# Patient Record
Sex: MALE | Race: ASIAN | NOT HISPANIC OR LATINO | URBAN - METROPOLITAN AREA
[De-identification: names, ages, dates, MRNs, and addresses within clinical notes are randomized per-mention and may not be internally consistent; named-entity substitution may affect disease eponyms.]

---

## 2018-10-04 ENCOUNTER — COMMUNICATION - HEALTHEAST (OUTPATIENT)
Dept: SCHEDULING | Facility: CLINIC | Age: 48
End: 2018-10-04

## 2021-01-20 ENCOUNTER — OFFICE VISIT - HEALTHEAST (OUTPATIENT)
Dept: FAMILY MEDICINE | Facility: CLINIC | Age: 51
End: 2021-01-20

## 2021-01-20 DIAGNOSIS — L03.211 FACIAL CELLULITIS: ICD-10-CM

## 2021-01-20 LAB
ALBUMIN SERPL-MCNC: 4 G/DL (ref 3.5–5)
ALP SERPL-CCNC: 66 U/L (ref 45–120)
ALT SERPL W P-5'-P-CCNC: 16 U/L (ref 0–45)
ANION GAP SERPL CALCULATED.3IONS-SCNC: 9 MMOL/L (ref 5–18)
AST SERPL W P-5'-P-CCNC: 19 U/L (ref 0–40)
BILIRUB SERPL-MCNC: 1 MG/DL (ref 0–1)
BUN SERPL-MCNC: 14 MG/DL (ref 8–22)
C REACTIVE PROTEIN LHE: 0.8 MG/DL (ref 0–0.8)
CALCIUM SERPL-MCNC: 8.8 MG/DL (ref 8.5–10.5)
CHLORIDE BLD-SCNC: 104 MMOL/L (ref 98–107)
CO2 SERPL-SCNC: 27 MMOL/L (ref 22–31)
CREAT SERPL-MCNC: 0.83 MG/DL (ref 0.7–1.3)
ERYTHROCYTE [DISTWIDTH] IN BLOOD BY AUTOMATED COUNT: 11.7 % (ref 11–14.5)
GFR SERPL CREATININE-BSD FRML MDRD: >60 ML/MIN/1.73M2
GLUCOSE BLD-MCNC: 127 MG/DL (ref 70–125)
HCT VFR BLD AUTO: 41.1 % (ref 40–54)
HGB BLD-MCNC: 14.1 G/DL (ref 14–18)
MCH RBC QN AUTO: 32.5 PG (ref 27–34)
MCHC RBC AUTO-ENTMCNC: 34.3 G/DL (ref 32–36)
MCV RBC AUTO: 95 FL (ref 80–100)
PLATELET # BLD AUTO: 115 THOU/UL (ref 140–440)
PMV BLD AUTO: 6.9 FL (ref 7–10)
POTASSIUM BLD-SCNC: 3.7 MMOL/L (ref 3.5–5)
PROT SERPL-MCNC: 7.5 G/DL (ref 6–8)
RBC # BLD AUTO: 4.34 MILL/UL (ref 4.4–6.2)
SODIUM SERPL-SCNC: 140 MMOL/L (ref 136–145)
WBC: 6.6 THOU/UL (ref 4–11)

## 2021-01-22 ENCOUNTER — OFFICE VISIT - HEALTHEAST (OUTPATIENT)
Dept: FAMILY MEDICINE | Facility: CLINIC | Age: 51
End: 2021-01-22

## 2021-01-22 ENCOUNTER — COMMUNICATION - HEALTHEAST (OUTPATIENT)
Dept: FAMILY MEDICINE | Facility: CLINIC | Age: 51
End: 2021-01-22

## 2021-01-22 DIAGNOSIS — Z21 ASYMPTOMATIC HUMAN IMMUNODEFICIENCY VIRUS (HIV) INFECTION STATUS (H): ICD-10-CM

## 2021-01-22 DIAGNOSIS — R73.9 ELEVATED BLOOD SUGAR: ICD-10-CM

## 2021-01-22 DIAGNOSIS — R21 RASH AND NONSPECIFIC SKIN ERUPTION: ICD-10-CM

## 2021-01-22 DIAGNOSIS — I10 BENIGN ESSENTIAL HYPERTENSION: ICD-10-CM

## 2021-01-22 LAB
VARICELLA ZOSTER DNA COMMENT: ABNORMAL
VZV DNA SPEC QL NAA+PROBE: ABNORMAL
VZV PCR SPECIMEN: ABNORMAL

## 2021-01-25 ENCOUNTER — COMMUNICATION - HEALTHEAST (OUTPATIENT)
Dept: FAMILY MEDICINE | Facility: CLINIC | Age: 51
End: 2021-01-25

## 2021-01-25 ENCOUNTER — OFFICE VISIT - HEALTHEAST (OUTPATIENT)
Dept: FAMILY MEDICINE | Facility: CLINIC | Age: 51
End: 2021-01-25

## 2021-01-25 DIAGNOSIS — I10 UNCONTROLLED HYPERTENSION: ICD-10-CM

## 2021-01-25 DIAGNOSIS — B02.9 HERPES ZOSTER WITHOUT COMPLICATION: ICD-10-CM

## 2021-01-25 DIAGNOSIS — R73.9 HYPERGLYCEMIA: ICD-10-CM

## 2021-01-25 DIAGNOSIS — Z21 ASYMPTOMATIC HUMAN IMMUNODEFICIENCY VIRUS (HIV) INFECTION STATUS (H): ICD-10-CM

## 2021-01-25 LAB
ALBUMIN SERPL-MCNC: 4 G/DL (ref 3.5–5)
ALP SERPL-CCNC: 64 U/L (ref 45–120)
ALT SERPL W P-5'-P-CCNC: 22 U/L (ref 0–45)
ANION GAP SERPL CALCULATED.3IONS-SCNC: 9 MMOL/L (ref 5–18)
AST SERPL W P-5'-P-CCNC: 23 U/L (ref 0–40)
BASOPHILS # BLD AUTO: 0 THOU/UL (ref 0–0.2)
BASOPHILS NFR BLD AUTO: 0 % (ref 0–2)
BILIRUB SERPL-MCNC: 0.8 MG/DL (ref 0–1)
BUN SERPL-MCNC: 11 MG/DL (ref 8–22)
CALCIUM SERPL-MCNC: 8.9 MG/DL (ref 8.5–10.5)
CHLORIDE BLD-SCNC: 104 MMOL/L (ref 98–107)
CHOLEST SERPL-MCNC: 162 MG/DL
CO2 SERPL-SCNC: 26 MMOL/L (ref 22–31)
CREAT SERPL-MCNC: 0.76 MG/DL (ref 0.7–1.3)
EOSINOPHIL # BLD AUTO: 0.2 THOU/UL (ref 0–0.4)
EOSINOPHIL NFR BLD AUTO: 3 % (ref 0–6)
ERYTHROCYTE [DISTWIDTH] IN BLOOD BY AUTOMATED COUNT: 13.1 % (ref 11–14.5)
FASTING STATUS PATIENT QL REPORTED: NO
GFR SERPL CREATININE-BSD FRML MDRD: >60 ML/MIN/1.73M2
GLUCOSE BLD-MCNC: 78 MG/DL (ref 70–125)
HBA1C MFR BLD: 5.7 %
HCT VFR BLD AUTO: 43.8 % (ref 40–54)
HDLC SERPL-MCNC: 22 MG/DL
HGB BLD-MCNC: 15.1 G/DL (ref 14–18)
IMM GRANULOCYTES # BLD: 0 THOU/UL
IMM GRANULOCYTES NFR BLD: 0 %
LDLC SERPL CALC-MCNC: 100 MG/DL
LYMPHOCYTES # BLD AUTO: 3.3 THOU/UL (ref 0.8–4.4)
LYMPHOCYTES NFR BLD AUTO: 42 % (ref 20–40)
MCH RBC QN AUTO: 31.3 PG (ref 27–34)
MCHC RBC AUTO-ENTMCNC: 34.5 G/DL (ref 32–36)
MCV RBC AUTO: 91 FL (ref 80–100)
MONOCYTES # BLD AUTO: 0.6 THOU/UL (ref 0–0.9)
MONOCYTES NFR BLD AUTO: 7 % (ref 2–10)
NEUTROPHILS # BLD AUTO: 3.6 THOU/UL (ref 2–7.7)
NEUTROPHILS NFR BLD AUTO: 47 % (ref 50–70)
PLATELET # BLD AUTO: 194 THOU/UL (ref 140–440)
PMV BLD AUTO: 9.2 FL (ref 8.5–12.5)
POTASSIUM BLD-SCNC: 3.9 MMOL/L (ref 3.5–5)
PROT SERPL-MCNC: 7.8 G/DL (ref 6–8)
RBC # BLD AUTO: 4.83 MILL/UL (ref 4.4–6.2)
SODIUM SERPL-SCNC: 139 MMOL/L (ref 136–145)
TRIGL SERPL-MCNC: 202 MG/DL
WBC: 7.7 THOU/UL (ref 4–11)

## 2021-01-25 ASSESSMENT — MIFFLIN-ST. JEOR: SCORE: 1510.36

## 2021-01-29 LAB
HIV1 RNA # PLAS NAA DL=20: ABNORMAL {COPIES}/ML
HIV1 RNA SERPL NAA+PROBE-LOG#: 5.1 {LOG_COPIES}/ML

## 2021-02-01 ENCOUNTER — OFFICE VISIT - HEALTHEAST (OUTPATIENT)
Dept: FAMILY MEDICINE | Facility: CLINIC | Age: 51
End: 2021-02-01

## 2021-02-01 ENCOUNTER — COMMUNICATION - HEALTHEAST (OUTPATIENT)
Dept: INFECTIOUS DISEASES | Facility: CLINIC | Age: 51
End: 2021-02-01

## 2021-02-01 DIAGNOSIS — B02.9 HERPES ZOSTER WITHOUT COMPLICATION: ICD-10-CM

## 2021-02-01 DIAGNOSIS — I10 BENIGN ESSENTIAL HYPERTENSION: ICD-10-CM

## 2021-02-01 DIAGNOSIS — Z21 ASYMPTOMATIC HUMAN IMMUNODEFICIENCY VIRUS (HIV) INFECTION STATUS (H): ICD-10-CM

## 2021-02-10 ENCOUNTER — RECORDS - HEALTHEAST (OUTPATIENT)
Dept: ADMINISTRATIVE | Facility: OTHER | Age: 51
End: 2021-02-10

## 2021-02-16 ENCOUNTER — OFFICE VISIT - HEALTHEAST (OUTPATIENT)
Dept: INFECTIOUS DISEASES | Facility: CLINIC | Age: 51
End: 2021-02-16

## 2021-02-16 DIAGNOSIS — B20 AIDS (ACQUIRED IMMUNE DEFICIENCY SYNDROME) (H): ICD-10-CM

## 2021-02-16 DIAGNOSIS — D72.810 CD4 T LYMPHOCYTE DEFICIENCY: ICD-10-CM

## 2021-02-16 DIAGNOSIS — B20 AIDS (H): ICD-10-CM

## 2021-02-17 LAB
CD3 CELLS # BLD: 2032 /UL
CD3 CELLS NFR BLD: 82 % (ref 53–86)
CD3+CD4+ CELLS # BLD: 381 /UL
CD3+CD4+ CELLS NFR BLD: 16 % (ref 31–67)
CD3+CD4+ CELLS/CD3+CD8+ CLL BLD: 0.24 % (ref 0.8–4.5)
CD3+CD8+ CELLS # BLD: 1569 /UL (ref 117–923)
CD3+CD8+ CELLS NFR BLD: 64 % (ref 13–44)
LYMPHOCYTES # BLD AUTO: 2.5 THOU/UL (ref 0.8–4.4)

## 2021-02-18 LAB
HIV1 RNA # PLAS NAA DL=20: ABNORMAL {COPIES}/ML
HIV1 RNA SERPL NAA+PROBE-LOG#: 4.8 {LOG_COPIES}/ML
T GONDII IGG SER-ACNC: >400 IU/ML
T GONDII IGM SER-ACNC: <3 AU/ML

## 2021-02-22 ENCOUNTER — COMMUNICATION - HEALTHEAST (OUTPATIENT)
Dept: FAMILY MEDICINE | Facility: CLINIC | Age: 51
End: 2021-02-22

## 2021-02-22 ENCOUNTER — AMBULATORY - HEALTHEAST (OUTPATIENT)
Dept: NURSING | Facility: CLINIC | Age: 51
End: 2021-02-22

## 2021-02-22 DIAGNOSIS — I10 BENIGN ESSENTIAL HYPERTENSION: ICD-10-CM

## 2021-02-24 ENCOUNTER — OFFICE VISIT - HEALTHEAST (OUTPATIENT)
Dept: FAMILY MEDICINE | Facility: CLINIC | Age: 51
End: 2021-02-24

## 2021-02-24 DIAGNOSIS — Z21 ASYMPTOMATIC HUMAN IMMUNODEFICIENCY VIRUS (HIV) INFECTION STATUS (H): ICD-10-CM

## 2021-02-24 DIAGNOSIS — I10 BENIGN ESSENTIAL HYPERTENSION: ICD-10-CM

## 2021-02-24 DIAGNOSIS — B02.29 POSTHERPETIC NEURALGIA: ICD-10-CM

## 2021-02-24 RX ORDER — METOPROLOL SUCCINATE 100 MG/1
100 TABLET, EXTENDED RELEASE ORAL DAILY
Qty: 90 TABLET | Refills: 1 | Status: SHIPPED | OUTPATIENT
Start: 2021-02-24 | End: 2021-09-23

## 2021-02-24 ASSESSMENT — MIFFLIN-ST. JEOR: SCORE: 1521.75

## 2021-02-25 LAB — ARUP MISCELLANEOUS TEST: NORMAL

## 2021-02-26 LAB
MISCELLANEOUS TEST DEPT. - HE HISTORICAL: NORMAL
PERFORMING LAB: NORMAL
SPECIMEN STATUS: NORMAL
TEST NAME: NORMAL

## 2021-03-01 ENCOUNTER — COMMUNICATION - HEALTHEAST (OUTPATIENT)
Dept: INFECTIOUS DISEASES | Facility: CLINIC | Age: 51
End: 2021-03-01

## 2021-03-09 ENCOUNTER — OFFICE VISIT - HEALTHEAST (OUTPATIENT)
Dept: INFECTIOUS DISEASES | Facility: CLINIC | Age: 51
End: 2021-03-09

## 2021-03-09 DIAGNOSIS — B20 AIDS (ACQUIRED IMMUNE DEFICIENCY SYNDROME) (H): ICD-10-CM

## 2021-04-06 ENCOUNTER — AMBULATORY - HEALTHEAST (OUTPATIENT)
Dept: LAB | Facility: CLINIC | Age: 51
End: 2021-04-06

## 2021-04-06 ENCOUNTER — OFFICE VISIT - HEALTHEAST (OUTPATIENT)
Dept: INFECTIOUS DISEASES | Facility: CLINIC | Age: 51
End: 2021-04-06

## 2021-04-06 DIAGNOSIS — Z21 ASYMPTOMATIC HUMAN IMMUNODEFICIENCY VIRUS (HIV) INFECTION STATUS (H): ICD-10-CM

## 2021-04-08 LAB
HIV1 RNA # PLAS NAA DL=20: 77 {COPIES}/ML
HIV1 RNA SERPL NAA+PROBE-LOG#: 1.9 {LOG_COPIES}/ML

## 2021-05-27 VITALS — DIASTOLIC BLOOD PRESSURE: 81 MMHG | HEART RATE: 85 BPM | SYSTOLIC BLOOD PRESSURE: 128 MMHG

## 2021-06-05 VITALS
HEART RATE: 84 BPM | WEIGHT: 151 LBS | SYSTOLIC BLOOD PRESSURE: 138 MMHG | BODY MASS INDEX: 22.96 KG/M2 | TEMPERATURE: 98.7 F | DIASTOLIC BLOOD PRESSURE: 92 MMHG

## 2021-06-05 VITALS
SYSTOLIC BLOOD PRESSURE: 172 MMHG | RESPIRATION RATE: 16 BRPM | HEART RATE: 134 BPM | WEIGHT: 153 LBS | OXYGEN SATURATION: 99 % | DIASTOLIC BLOOD PRESSURE: 99 MMHG

## 2021-06-05 VITALS
BODY MASS INDEX: 24.07 KG/M2 | TEMPERATURE: 98 F | SYSTOLIC BLOOD PRESSURE: 120 MMHG | DIASTOLIC BLOOD PRESSURE: 70 MMHG | HEART RATE: 68 BPM | WEIGHT: 157 LBS

## 2021-06-05 VITALS
TEMPERATURE: 98.5 F | WEIGHT: 152.5 LBS | DIASTOLIC BLOOD PRESSURE: 82 MMHG | HEIGHT: 68 IN | SYSTOLIC BLOOD PRESSURE: 123 MMHG | HEART RATE: 80 BPM | BODY MASS INDEX: 23.11 KG/M2 | RESPIRATION RATE: 18 BRPM

## 2021-06-05 VITALS
HEIGHT: 68 IN | HEART RATE: 106 BPM | SYSTOLIC BLOOD PRESSURE: 148 MMHG | BODY MASS INDEX: 22.58 KG/M2 | RESPIRATION RATE: 20 BRPM | DIASTOLIC BLOOD PRESSURE: 108 MMHG | WEIGHT: 149 LBS

## 2021-06-05 VITALS
HEART RATE: 103 BPM | SYSTOLIC BLOOD PRESSURE: 154 MMHG | TEMPERATURE: 98.3 F | WEIGHT: 148 LBS | DIASTOLIC BLOOD PRESSURE: 99 MMHG

## 2021-06-05 VITALS
DIASTOLIC BLOOD PRESSURE: 93 MMHG | BODY MASS INDEX: 22.81 KG/M2 | SYSTOLIC BLOOD PRESSURE: 135 MMHG | HEART RATE: 91 BPM | WEIGHT: 150 LBS | RESPIRATION RATE: 24 BRPM

## 2021-06-05 VITALS
TEMPERATURE: 98.5 F | DIASTOLIC BLOOD PRESSURE: 88 MMHG | BODY MASS INDEX: 23.31 KG/M2 | SYSTOLIC BLOOD PRESSURE: 122 MMHG | WEIGHT: 152 LBS | HEART RATE: 80 BPM

## 2021-06-14 ENCOUNTER — RECORDS - HEALTHEAST (OUTPATIENT)
Dept: FAMILY MEDICINE | Facility: CLINIC | Age: 51
End: 2021-06-14

## 2021-06-14 NOTE — PROGRESS NOTES
ASSESSMENT/PLAN:  1. Herpes zoster without complication     2. Asymptomatic human immunodeficiency virus (HIV) infection status (H)  HM1(CBC and Differential)    HIV-1 RNA Quantification   3. Uncontrolled hypertension  metoprolol succinate (TOPROL-XL) 50 MG 24 hr tablet    Comprehensive Metabolic Panel    Lipid Cascade FASTING   4. Hyperglycemia  Glycosylated Hemoglobin A1c       This is a 51 yo male with:  1.  Herpes zoster - reviewed recent lab results - positive for herpes zoster.  Rash is improving.  Pain is not significant.   2.  HIV infection - asymptomatic - patient has h/o this asymptomatic HIV status - not taking any medications; will check quant HIV to check on status  3.  Uncontrolled hypertension - this needs to be controlled more quickly and better - increase Metoprolol and recheck 7-10 days  4.  Hyperglycemia - check A1c to determine if patient is diabetic.     Return in about 10 days (around 2/4/2021) for Recheck, BP Check.      There are no discontinued medications.  There are no Patient Instructions on file for this visit.    Chief Complaint:  Chief Complaint   Patient presents with     Follow-up     skin       HPI:   Cameron Jensen is a 50 y.o. male c/o  1.  Rash is getting better - pain isn't bad -   No further spreading  Scabbed over now  2.  HIV infection - patient has h/o HIV  Doesn't recall taking medications in past (review of chart suggests at least at one time he was taking Truvada)  3.  Hypertension - no chest pain, no short of breath  4.  Denies h/o diabetes    PMH:   Patient Active Problem List    Diagnosis Date Noted     Herpes zoster without complication 01/31/2021     Nonspecific reaction to tuberculin skin test without active tuberculosis 12/17/2004     Asymptomatic human immunodeficiency virus (hiv) infection status (H) 10/20/2003     No past medical history on file.  No past surgical history on file.  Social History     Socioeconomic History     Marital status: Single     Spouse name:  "Not on file     Number of children: Not on file     Years of education: Not on file     Highest education level: Not on file   Occupational History     Not on file   Social Needs     Financial resource strain: Not on file     Food insecurity     Worry: Not on file     Inability: Not on file     Transportation needs     Medical: Not on file     Non-medical: Not on file   Tobacco Use     Smoking status: Current Every Day Smoker     Smokeless tobacco: Current User   Substance and Sexual Activity     Alcohol use: Not Currently     Drug use: Never     Sexual activity: Not on file   Lifestyle     Physical activity     Days per week: Not on file     Minutes per session: Not on file     Stress: Not on file   Relationships     Social connections     Talks on phone: Not on file     Gets together: Not on file     Attends Rastafari service: Not on file     Active member of club or organization: Not on file     Attends meetings of clubs or organizations: Not on file     Relationship status: Not on file     Intimate partner violence     Fear of current or ex partner: Not on file     Emotionally abused: Not on file     Physically abused: Not on file     Forced sexual activity: Not on file   Other Topics Concern     Not on file   Social History Narrative     Not on file     No family history on file.    Meds:    Current Outpatient Medications:      valACYclovir (VALTREX) 1000 MG tablet, Take 1 tablet (1,000 mg total) by mouth 3 (three) times a day for 10 days., Disp: 30 tablet, Rfl: 0     metoprolol succinate (TOPROL-XL) 50 MG 24 hr tablet, Take 1 tablet (50 mg total) by mouth daily., Disp: 30 tablet, Rfl: 1    Allergies:  No Known Allergies    ROS:  Pertinent positives as noted in HPI; otherwise 12 point ROS negative.      Physical Exam:  EXAM:  BP (!) 148/108 (Patient Site: Right Arm, Patient Position: Sitting, Cuff Size: Adult Regular)   Pulse (!) 106   Resp 20   Ht 5' 8\" (1.727 m)   Wt 149 lb (67.6 kg)   BMI 22.66 kg/m   "   Gen:  NAD, appears well, well-hydrated  HEENT:  TMs nl, oropharynx benign, nasal mucosa nl, conjunctiva clear  Neck:  Supple, no adenopathy, no thyromegaly, no carotid bruits, no JVD  Lungs:  Clear to auscultation bilaterally  Cor:  RRR no murmur  Abd:  Soft, nontender, BS+, no masses, no guarding or rebound, no HSM  Extr:  Neg.  Neuro:  No asymmetry  Skin:  Warm/dry, scabbed lesions - erythema improved - on lower right mandible/neck - does not cross midline to left side        Results:  Results for orders placed or performed in visit on 01/25/21   Glycosylated Hemoglobin A1c   Result Value Ref Range    Hemoglobin A1c 5.7 (H) <=5.6 %   Comprehensive Metabolic Panel   Result Value Ref Range    Sodium 139 136 - 145 mmol/L    Potassium 3.9 3.5 - 5.0 mmol/L    Chloride 104 98 - 107 mmol/L    CO2 26 22 - 31 mmol/L    Anion Gap, Calculation 9 5 - 18 mmol/L    Glucose 78 70 - 125 mg/dL    BUN 11 8 - 22 mg/dL    Creatinine 0.76 0.70 - 1.30 mg/dL    GFR MDRD Af Amer >60 >60 mL/min/1.73m2    GFR MDRD Non Af Amer >60 >60 mL/min/1.73m2    Bilirubin, Total 0.8 0.0 - 1.0 mg/dL    Calcium 8.9 8.5 - 10.5 mg/dL    Protein, Total 7.8 6.0 - 8.0 g/dL    Albumin 4.0 3.5 - 5.0 g/dL    Alkaline Phosphatase 64 45 - 120 U/L    AST 23 0 - 40 U/L    ALT 22 0 - 45 U/L   Lipid Cascade FASTING   Result Value Ref Range    Cholesterol 162 <=199 mg/dL    Triglycerides 202 (H) <=149 mg/dL    HDL Cholesterol 22 (L) >=40 mg/dL    LDL Calculated 100 <=129 mg/dL    Patient Fasting > 8hrs? No    HIV-1 RNA Quantification   Result Value Ref Range    HIV-1 RNA Qt 355157 (!) HIVND [Copies]/mL    Log of HIV RNA Qt 5.1 (H) <1.3 [Log_copies]/mL   HM1 (CBC with Diff)   Result Value Ref Range    WBC 7.7 4.0 - 11.0 thou/uL    RBC 4.83 4.40 - 6.20 mill/uL    Hemoglobin 15.1 14.0 - 18.0 g/dL    Hematocrit 43.8 40.0 - 54.0 %    MCV 91 80 - 100 fL    MCH 31.3 27.0 - 34.0 pg    MCHC 34.5 32.0 - 36.0 g/dL    RDW 13.1 11.0 - 14.5 %    Platelets 194 140 - 440 thou/uL     MPV 9.2 8.5 - 12.5 fL    Neutrophils % 47 (L) 50 - 70 %    Lymphocytes % 42 (H) 20 - 40 %    Monocytes % 7 2 - 10 %    Eosinophils % 3 0 - 6 %    Basophils % 0 0 - 2 %    Immature Granulocyte % 0 <=0 %    Neutrophils Absolute 3.6 2.0 - 7.7 thou/uL    Lymphocytes Absolute 3.3 0.8 - 4.4 thou/uL    Monocytes Absolute 0.6 0.0 - 0.9 thou/uL    Eosinophils Absolute 0.2 0.0 - 0.4 thou/uL    Basophils Absolute 0.0 0.0 - 0.2 thou/uL    Immature Granulocyte Absolute 0.0 <=0.0 thou/uL

## 2021-06-14 NOTE — TELEPHONE ENCOUNTER
ID Team      Please review and advise on scheduling.     Authorizing: Yun Vyas MD in [facility] FAM[facility]IC[facility]/OB    Referral: [id number] (Pending Review)           Priority: Routine   Diagnosis: Asymptomatic human immunodeficiency virus (hiv) infection status

## 2021-06-14 NOTE — PATIENT INSTRUCTIONS - HE
Stop the Metoprolol 50 mg daily.   new prescription at pharmacy and take Metoprolol 100 mg daily (one tablet).    Recheck blood pressure in 2-3 weeks.

## 2021-06-14 NOTE — PROGRESS NOTES
ASSESSMENT/PLAN:  1. Rash and nonspecific skin eruption  valACYclovir (VALTREX) 1000 MG tablet    Varicella-Zoster Virus DNA by PCR, CSF or Skin Swab(VZDNA)   2. Asymptomatic human immunodeficiency virus (HIV) infection status (H)  HIV-1 RNA Quantification    HM1(CBC and Differential)   3. Elevated blood sugar  Glycosylated Hemoglobin A1c    Comprehensive Metabolic Panel   4. Benign essential hypertension         This is a 49 yo male with h/o previously asymptomatic HIV infection status as well as h/o TB.  He is unable to provide any details of these diagnoses; he indicates he never took any medications for this, but outside records would indicate that he took Truvada and perhaps other medications in past. Here with:  1.  Rash/cellulitis - right face/ neck.  He presented 2 days ago with what was felt to be a cellulitis/rash on face.  Today, he feels as if it is a bit worse - taking cephalexin three times a day.  The rash is present on lateral mandibular region extending to the right ear - with inferior pinna very swollen and red; this extends to the neck and sl superior chest - all of this extended midline to midline - does not cross the midline; there are small vescular./pustular lesions on erythematous patches.  It is all quite inflamed.  I am fairly certain this is a herpes zoster rash.  It does seem to cross at least a couple dermatomes :  Right C3 and C4.  Will add Valacyclovir; continue his Cephalexin. Culture sent to verify diagnosis.   Would like to see him back in 2-3 days for re-evaluation. Discussed (especially in light of his underlying presumed HIV status) that he should seek treatment emergently if he is getting worse.    2.  Asymptomatic HIV status:  Patient has no idea what the status of this infection is.  Will check quant RNA.  3.  Elevated blood sugar - will add a1c to rule out diabetes  4.  Benign HTN - blood pressure is too high - will review at next visit.    Return in about 2 days (around  1/24/2021) for Recheck.      There are no discontinued medications.  There are no Patient Instructions on file for this visit.    Chief Complaint:  Chief Complaint   Patient presents with     Follow-up     rash and neck        HPI:   Cameron Jensen is a 50 y.o. male c/o  Seen for face infection/swelling, right ear pain a couple days ago  Taking Cephalexin 500 mg three times a day x 10 days - has 8 days to go     Doesn't know status of his HIV; doesn't know if he ever had TB  Thinks he had something like this in past       PMH:   There are no active problems to display for this patient.    No past medical history on file.  No past surgical history on file.  Social History     Socioeconomic History     Marital status: Single     Spouse name: Not on file     Number of children: Not on file     Years of education: Not on file     Highest education level: Not on file   Occupational History     Not on file   Social Needs     Financial resource strain: Not on file     Food insecurity     Worry: Not on file     Inability: Not on file     Transportation needs     Medical: Not on file     Non-medical: Not on file   Tobacco Use     Smoking status: Current Every Day Smoker     Smokeless tobacco: Current User   Substance and Sexual Activity     Alcohol use: Not Currently     Drug use: Never     Sexual activity: Not on file   Lifestyle     Physical activity     Days per week: Not on file     Minutes per session: Not on file     Stress: Not on file   Relationships     Social connections     Talks on phone: Not on file     Gets together: Not on file     Attends Congregation service: Not on file     Active member of club or organization: Not on file     Attends meetings of clubs or organizations: Not on file     Relationship status: Not on file     Intimate partner violence     Fear of current or ex partner: Not on file     Emotionally abused: Not on file     Physically abused: Not on file     Forced sexual activity: Not on file   Other  Topics Concern     Not on file   Social History Narrative     Not on file     No family history on file.    Meds:    Current Outpatient Medications:      cephalexin (KEFLEX) 500 MG capsule, Take 1 capsule (500 mg total) by mouth 3 (three) times a day for 10 days., Disp: 30 capsule, Rfl: 0     mupirocin (BACTROBAN) 2 % ointment, Apply to affected area 3 times daily 7 days, Disp: 22 g, Rfl: 0     valACYclovir (VALTREX) 1000 MG tablet, Take 1 tablet (1,000 mg total) by mouth 3 (three) times a day for 10 days., Disp: 30 tablet, Rfl: 0    Allergies:  No Known Allergies    ROS:  Pertinent positives as noted in HPI; otherwise 12 point ROS negative.      Physical Exam:  EXAM:  BP (!) 154/99 (Patient Site: Right Arm, Patient Position: Sitting, Cuff Size: Adult Regular)   Pulse (!) 103   Temp 98.3  F (36.8  C) (Temporal)   Wt 148 lb (67.1 kg)    Gen:  NAD, appears well, well-hydrated  HEENT:  TMs nl, oropharynx benign, nasal mucosa nl, conjunctiva clear  Neck:  Supple, no adenopathy, no thyromegaly, no carotid bruits, no JVD  Lungs:  Clear to auscultation bilaterally  Cor:  RRR no murmur  Abd:  Soft, nontender, BS+, no masses, no guarding or rebound, no HSM  Extr:  Neg.  Neuro:  No asymmetry  Skin:  Warm/dry; impressive rash on right lower face, ear (pinna is swollen ) and distal to upper chest wall - there are discrete red patches with pustular lesions as well as crusted lesions - all of this extends from midline to midline        Results:  Results for orders placed or performed in visit on 01/20/21   2(CBC w/o Differential)   Result Value Ref Range    WBC 6.6 4.0 - 11.0 thou/uL    RBC 4.34 (L) 4.40 - 6.20 mill/uL    Hemoglobin 14.1 14.0 - 18.0 g/dL    Hematocrit 41.1 40.0 - 54.0 %    MCV 95 80 - 100 fL    MCH 32.5 27.0 - 34.0 pg    MCHC 34.3 32.0 - 36.0 g/dL    RDW 11.7 11.0 - 14.5 %    Platelets 115 (L) 140 - 440 thou/uL    MPV 6.9 (L) 7.0 - 10.0 fL   Comprehensive Metabolic Panel   Result Value Ref Range    Sodium  140 136 - 145 mmol/L    Potassium 3.7 3.5 - 5.0 mmol/L    Chloride 104 98 - 107 mmol/L    CO2 27 22 - 31 mmol/L    Anion Gap, Calculation 9 5 - 18 mmol/L    Glucose 127 (H) 70 - 125 mg/dL    BUN 14 8 - 22 mg/dL    Creatinine 0.83 0.70 - 1.30 mg/dL    GFR MDRD Af Amer >60 >60 mL/min/1.73m2    GFR MDRD Non Af Amer >60 >60 mL/min/1.73m2    Bilirubin, Total 1.0 0.0 - 1.0 mg/dL    Calcium 8.8 8.5 - 10.5 mg/dL    Protein, Total 7.5 6.0 - 8.0 g/dL    Albumin 4.0 3.5 - 5.0 g/dL    Alkaline Phosphatase 66 45 - 120 U/L    AST 19 0 - 40 U/L    ALT 16 0 - 45 U/L   C-Reactive Protein   Result Value Ref Range    CRP 0.8 0.0 - 0.8 mg/dL

## 2021-06-15 NOTE — PROGRESS NOTES
ASSESSMENT/PLAN:  1. Postherpetic neuralgia     2. Benign essential hypertension  metoprolol succinate (TOPROL-XL) 100 MG 24 hr tablet   3. Asymptomatic human immunodeficiency virus (hiv) infection status (H)         This is a 51 yo male with underlying HIV (now following with ID again).  Had recent severe herpes zoster right ear/neck//chest.  Now complains of pain, but mostly tingling of the right ear/pinna.  Discussed with patient - much of these symptoms have improved - and I suspect the tingling of the ear will continue to improve as well.  It is not particularly painful enough and patient does not want to consider pain meds (such as gabapentin).  Encourage patient to follow up with ID for the HIV.    Also - patient has underlying hypertension - now controlled.  Continue Metoprolol - refill written.   Return in about 6 months (around 8/24/2021) for BP Check.      Medications Discontinued During This Encounter   Medication Reason     metoprolol succinate (TOPROL-XL) 100 MG 24 hr tablet Reorder     There are no Patient Instructions on file for this visit.    Chief Complaint:  Chief Complaint   Patient presents with     Follow-up       HPI:   Brenda THORNTON Camila is a 50 y.o. male c/o  Complains of numbness/ tingling in right ear/neck  Ear is chilling  When scratches the ear, doesn't feel any sensation    Didn't use cream because it was $300 -  Apparently paid $10 for ointment, but the cream (Mupirocin) was $293!  Wants to know what to do about the cream  Worries about the color of the rash - (scars from zoster - healing) - should avoid sun exposure; should use sun screen     Blood pressure medication -   BP is stable  -     PMH:   Patient Active Problem List    Diagnosis Date Noted     Benign essential hypertension 02/01/2021     Herpes zoster without complication 01/31/2021     Nonspecific reaction to tuberculin skin test without active tuberculosis 12/17/2004     Asymptomatic human immunodeficiency virus (hiv)  infection status (H) 10/20/2003     No past medical history on file.  No past surgical history on file.  Social History     Socioeconomic History     Marital status: Single     Spouse name: Not on file     Number of children: Not on file     Years of education: Not on file     Highest education level: Not on file   Occupational History     Not on file   Social Needs     Financial resource strain: Not on file     Food insecurity     Worry: Not on file     Inability: Not on file     Transportation needs     Medical: Not on file     Non-medical: Not on file   Tobacco Use     Smoking status: Current Every Day Smoker     Smokeless tobacco: Current User   Substance and Sexual Activity     Alcohol use: Not Currently     Drug use: Never     Sexual activity: Not on file   Lifestyle     Physical activity     Days per week: Not on file     Minutes per session: Not on file     Stress: Not on file   Relationships     Social connections     Talks on phone: Not on file     Gets together: Not on file     Attends Islam service: Not on file     Active member of club or organization: Not on file     Attends meetings of clubs or organizations: Not on file     Relationship status: Not on file     Intimate partner violence     Fear of current or ex partner: Not on file     Emotionally abused: Not on file     Physically abused: Not on file     Forced sexual activity: Not on file   Other Topics Concern     Not on file   Social History Narrative     Not on file     No family history on file.    Meds:    Current Outpatient Medications:      metoprolol succinate (TOPROL-XL) 100 MG 24 hr tablet, Take 1 tablet (100 mg total) by mouth daily., Disp: 90 tablet, Rfl: 1    Allergies:  No Known Allergies    ROS:  Pertinent positives as noted in HPI; otherwise 12 point ROS negative.      Physical Exam:  EXAM:  /82 (Patient Site: Right Arm, Patient Position: Sitting, Cuff Size: Adult Regular)   Pulse 80   Temp 98.5  F (36.9  C) (Temporal)   " Resp 18   Ht 5' 7.72\" (1.72 m)   Wt 152 lb 8 oz (69.2 kg)   BMI 23.38 kg/m     Gen:  NAD, appears well, well-hydrated  HEENT:  TMs nl, oropharynx benign, nasal mucosa nl, conjunctiva clear  Neck:  Supple, no adenopathy, no thyromegaly, no carotid bruits, no JVD  Lungs:  Clear to auscultation bilaterally  Cor:  RRR no murmur  Abd:  Soft, nontender, BS+, no masses, no guarding or rebound, no HSM  Extr:  Neg.  Neuro:  No asymmetry  Skin:  Warm/dry, healing lesions on right ear/chin/neck onto chest - some discoloration - mild dysesthesias of right ea      Results:  Results for orders placed or performed in visit on 02/16/21   HIV-1 RNA Quantification   Result Value Ref Range    HIV-1 RNA Qt 74066 (!) HIVND [Copies]/mL    Log of HIV RNA Qt 4.8 (H) <1.3 [Log_copies]/mL   hiv resistance genotype testing.  usually done a [facility]. Lab Test   Result Value Ref Range    Miscellanous Test Dept. Chemistry Reference Test     Test Name        HIV1 Genotype and Integrase Inhibitor Resistance by Sequencing    Performing Lab       [facility]  [address] [place] [id number]    Scan Result See [facility] Miscellaneous Test for results    Toxoplasma gondii Antibody, IgG and IgM   Result Value Ref Range    Toxoplasma gondii Ab, IgG >400.0 IU/mL    Toxoplasma gondii Ab, IgM <3.0 <=7.9 AU/mL   HSP   Result Value Ref Range    Lymphocytes Absolute 2.5 0.8 - 4.4 thou/uL    CD3 % Total T Cell 82 53 - 86 %    CD3 Abs 2,032 471-2,787 /uL    CD4 % Roxana T Cell 16 (L) 31 - 67 %    CD4 T Cell Abs 381 269-1,625 /uL    CD8 % Suppressor T Cell 64 (H) 13 - 44 %    CD8 T Cell Abs 1,569 (H) 117 - 923 /uL    CD4/CD8 Ratio 0.24 (L) 0.80 - 4.50   [facility]   Result Value Ref Range    [facility] Miscellaneous Test SEE NOTE              "

## 2021-06-15 NOTE — PATIENT INSTRUCTIONS - HE
Start on Biktarvy and stay on it long-term.    We will call to [facility].    Please return in 4 weeks.    Robel Muñiz

## 2021-06-15 NOTE — TELEPHONE ENCOUNTER
LVMTCB- has appt 3/9/21 will discuss then if pt does not call back    ----- Message from Robel Muñiz MD sent at 2/18/2021  1:00 PM CST -----  Good news! His CD4 count remains 381 so he is in no immanent risk for HIV-related infectoin besides the zoster.  We will start on meds as soon as we document to viral resistance is present.  Robel Muñiz

## 2021-06-15 NOTE — PROGRESS NOTES
Infectious Disease Progress Note  Assessment:  1. HIV pos since 2002.  Was followed at [facility]. Was transiently on Truvada and kaletra [id number]. None since.  2. Has since has stable CD4 counts around 400 and viral load <10,000 x 19 years.      CD4 last month still 380 and VL 67,000 and HIV genotype wo resistance.  3. Tested pos on IGRA test x 1 with three subsequent neg tests. Neg for HCV and Pos for HBsAB. Pos for HAV antibody. Toxo antibody pos.  Past tests for syphilis, GC, chlamydia all negative  4.  Has sex once a week with +- condom use.  5. 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  6. Now with recent VZV L V1 near area and recent viral load of 110,000. Thus now progressed to AIDS.      No fever, wt loss, shortness of breath and cbc and cmc all normal.     Recommendations:  1. Start Biktarvy now.  2. Use condoms until further notice.  3. RTC one month.  4. F/U HIV labs then.    30 min spent. Had to use Urdu  to communicate.     Thanks for asking me to participate in this patients care.  Robel Muñiz      ______________________________________________________________________    Subjective: doing ok, no new sx.  Wt stable.    Review of systems:  Patient denies fever, chills, cough, sputum, vomiting, diarrhea, dysuria, urgency, flank pain, headache, stiff neck, rash, swollen glands or pain at IV sites.  SH/FH/ Habits/ PMH reviewed and unchanged.  Objective:   /88   Pulse 80   Temp 98.5  F (36.9  C)   Wt 152 lb (68.9 kg)   BMI 23.31 kg/m          Exam:skin: some redness R neck s/p VZV here.  Nodes: no cervical, axillary or inguinal adenopathy  ENT: TMs clear, oropharynx without exudate or thrush  Respiratory: normal respiratory patter, no rales or rubs.  CV: normal heart tones. No rub, murmur or S3. No JVD.  Abdmomen: soft, normal bowel sounds, non-tender, no masses or   organomegaly    Back: No spine or CVA tenderness  Neurologic: oriented x 3. Cranial nerves 2-12 intact. No focal  weakness or sensory loss.   Neck: supple, no masses, no thyromegaly.                Lab Results   Component Value Date    ALT 22 01/25/2021    AST 23 01/25/2021    ALKPHOS 64 01/25/2021    BILITOT 0.8 01/25/2021       Imaging:none new  Microbiology:HIV GENOTYPE neg for resistance.    Patient Active Problem List   Diagnosis     Nonspecific reaction to tuberculin skin test without active tuberculosis     Asymptomatic human immunodeficiency virus (hiv) infection status (H)     Herpes zoster without complication     Benign essential hypertension

## 2021-06-15 NOTE — PROGRESS NOTES
Consultation - Infectious Disease  [facility] Cameron Jensen,  1970, MRN [id number]    [facility] ID CL[facility].  Asymptomatic human immunodeficiency virus (hiv) infection status (H) [Z21]    PCP: Yun Vyas MD, [phone]   Code status:  full       Extended Emergency Contact Information  Primary Emergency Contact: Magdalena Prieto  Mobile Phone: [phone]  Relation: Niece       Assessment and Plan     Assessment:  1. HIV pos since .  Was followed at [facility]. Was transiently on Truvada and kaletra [phone]. None since.  2. Has since has stable CD4 counts around 400 and viral load <10,000 x 19 years.  3. Tested pos on IGRA test x 1 with three subsequent neg tests. Neg for HCV and Pos for HBsAB. Past tests for syphilis, GC, chlamydia all negative  4.  Has sex once a week with +- condom use.  5. Works in a [employer] company and lives alone.  6. Now with recent VZV L V1 near area and recent viral load of 110,000. Thus now progressed to AIDS.      No fever, wt loss, shortness of breath and cbc and cmc all normal.    Recommendations:  1. Check CD4, viral resistance genotype, toxo serologies.  2. RTC 3 weeks when results should be back and  Likely start Biktarvy or Delstrigo then.  Start prophylaxis for PJP and MAC as needed based on CD4 count.  I spent a lot of time via  explaining the necessity of medication at this point and he seems to understand.  3. Use condoms until further notice.  4. Check toxo serology     Thanks for asking me to participate in this patients care.  Robel Muñiz       Chief Complaint Recent shinles.       HPI   We have been requested by Dr Vyas to evaluate [facility] Cameron Jensen for AIDS  Hx is as above. Other than recent H zoster he has no active symptoms. No fever, wt stable, no sx STD, no diarrea, no shortness of breath.  Has purple rash R neck and ear in area of healing Zoster .     Medical History  Active Ambulatory (Non-Hospital) Problems    Diagnosis     Benign  essential hypertension     Herpes zoster without complication     Nonspecific reaction to tuberculin skin test without active tuberculosis     Asymptomatic human immunodeficiency virus (hiv) infection status (H)     No past medical history on file.  PMH reviewed, updated, w/o changes. Surgical History  He  has no past surgical history on file.   Social History  Reviewed, and he  reports that he has been smoking. He uses smokeless tobacco. He reports previous alcohol use. He reports that he does not use drugs.   Allergies  No Known Allergies Family History  Reviewed, and family history is not on file.        Prior to Admission Medications   Current Outpatient Medications on File Prior to Visit   Medication Sig Dispense Refill     metoprolol succinate (TOPROL-XL) 100 MG 24 hr tablet Take 1 tablet (100 mg total) by mouth daily. 30 tablet 1     No current facility-administered medications on file prior to visit.             Review of Systems:  Review of systems - negative as above. Physical Exam:  Looks fine.  Superficial patcy purple rash R nkechi and adjacent neg  Eyes: anicteric. EOMs full. GERSON.  Neck: supple, no masses, no thyromegaly.  Nodes: no cervical, axillary or inguinal adenopathy  Respiratory: normal respiratory patter, no rales or rubs.  CV: normal heart tones. No rub, murmur or S3. No JVD.  Abdmomen: soft, normal bowel sounds, non-tender, no masses or   organomegaly  .  Back: No spine or CVA tenderness  Extremities: normal venous pattern. Good pulses. No edema. No joint effusions..  Neurologic: oriented x 3. Cranial nerves 2-12 intact. No focal weakness or sensory loss.            Pertinent Labs  Lab Results:personally reviewed.   Lab Results   Component Value Date     01/25/2021     01/20/2021    K 3.9 01/25/2021    K 3.7 01/20/2021    CO2 26 01/25/2021    CO2 27 01/20/2021    BUN 11 01/25/2021    BUN 14 01/20/2021    CREATININE 0.76 01/25/2021    CREATININE 0.83 01/20/2021    CALCIUM 8.9  01/25/2021    CALCIUM 8.8 01/20/2021     Lab Results   Component Value Date    WBC 7.7 01/25/2021    WBC 6.6 01/20/2021    HGB 15.1 01/25/2021    HGB 14.1 01/20/2021    HCT 43.8 01/25/2021    HCT 41.1 01/20/2021    MCV 91 01/25/2021    MCV 95 01/20/2021     01/25/2021     (L) 01/20/2021     Lab Results   Component Value Date    ALT 22 01/25/2021    AST 23 01/25/2021    ALKPHOS 64 01/25/2021    BILITOT 0.8 01/25/2021     HIV VL 2/3/21 was 110,000.     Pertinent Radiology  Radiology Results: none new. 2002 and 2005 cxr were negative  Microbiology:none

## 2021-06-15 NOTE — PATIENT INSTRUCTIONS - HE
1. We will do lab studies today  2. Return here is 3-4 weeks and we will start a medication to fix your immune system and keep you healthy and happy.  3. Be sure to keep a follow visit.

## 2021-06-16 PROBLEM — I10 BENIGN ESSENTIAL HYPERTENSION: Status: ACTIVE | Noted: 2021-02-01

## 2021-06-16 PROBLEM — B02.9 HERPES ZOSTER WITHOUT COMPLICATION: Status: ACTIVE | Noted: 2021-01-31

## 2021-06-16 NOTE — PROGRESS NOTES
Infectious Disease Progress Note  Assessment:  1. HIV pos since 2002.  Was followed at [facility]. Was transiently on Truvada and kaletra [phone]. None since.  2. Has since has stable CD4 counts around 400 and viral load <10,000 x 19 years.      CD4 2/21 was still 380 and VL 67,000 and HIV genotype w/o resistance.  3. Tested pos on IGRA test x 1 with three subsequent neg tests. Neg for HCV and Pos for HBsAB. Pos for HAV antibody. Toxo antibody pos.  Past tests for syphilis, GC, chlamydia all negative  4.  Has sex once a week with +- condom use.  5. 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  6. Now with recent VZV L V1 near area and recent viral load of 110,000. Thus now progressed to AIDS.  No fever, wt loss, shortness of breath and cbc and cmc all normal.  7. Starte Biktarvy about 3/19 and tolerating it well    Recommendations:  1. cont Biktarvy   2. Use condoms until further notice.  3. Follow-up HIV VL today  4. RTC 2 mos.    Robel Muñiz MD    ______________________________________________________________________    Subjective: doing fine. Takes the med every day after dinner. No ill effects.  Didn't start until about 3/19.    Review of systems:  Patient denies fever, chills, cough, sputum, vomiting, diarrhea, dysuria, urgency, flank pain, headache, stiff neck, rash, swollen glands or pain at IV sites.  SH/FH/ Habits/ PMH reviewed and unchanged.  Objective:   /70   Pulse 68   Temp 98  F (36.7  C)   Wt 157 lb (71.2 kg)   BMI 24.07 kg/m    Skin: no rashes or petechiae  Nodes: neg  Respiratory: normal respiratory patter, no rales or rubs.  CV: normal heart tones. No rub, murmur or S3. No JVD.  Abdmomen: soft, normal bowel sounds, non-tender, no masses or   organomegaly                  Lab Results   Component Value Date    ALT 22 01/25/2021    AST 23 01/25/2021    ALKPHOS 64 01/25/2021    BILITOT 0.8 01/25/2021       Imaging:none  Microbiology: none new.    Patient Active Problem List   Diagnosis      Nonspecific reaction to tuberculin skin test without active tuberculosis     Asymptomatic human immunodeficiency virus (hiv) infection status (H)     Herpes zoster without complication     Benign essential hypertension

## 2021-06-16 NOTE — PATIENT INSTRUCTIONS - HE
Continue to take the Biktarvy every day. That is the only way to make the virus disappear.    We will check your blood today and again in two months to be sure the virus has disappeared.    See you in two months.   Keep up the good work.    OK to get Covid vaccination as soon as possible.  Thanks,  Robel Muñiz  [facility]  [phone] [facility] suki

## 2021-06-21 NOTE — LETTER
"Letter by Yun Vyas MD at      Author: Yun Vyas MD Service: -- Author Type: --    Filed:  Encounter Date: 1/25/2021 Status: (Other)         Cameron Jensen  [address] [place] [address]             January 25, 2021         Dear Mr. Jensen,    Below are the results from your recent visit:    Resulted Orders   Varicella-Zoster Virus DNA by PCR, CSF or Skin Swab(VZDNA)   Result Value Ref Range    VZV PCR Specimen Skin     Varicella Zoster DNA VZV DNA DETECTED (!) VZVNG    Varicella Zoster DNA Comment See Comment Below       Comment:        The qualitative Varicella zoster virus DNA assay is a real-time polymerase chain  reaction (PCR) utilizing analyte specific reagents manufactured by [employer]. This test was developed and its performance characteristics  determined by the [facility] Infectious Diseases Diagnostic  Clinical Laboratory.  Analyte Specific Reagents (ASRs) are used in many laboratory tests necessary for  standard medical care and generally do not require FDA approval. The FDA has  determined that such clearance is not necessary. This test is used for clinical  purposes.  It should not be regarded as investigational or for research.  This Laboratory is certified under the Clinical Laboratory Improvement  Amendments of 1988 (CLIA-88) as qualified to perform high complexity clinical  laboratory testing.    Performed and/or entered by:  [place] [address]     Narrative    Reported to [facility], per [place] statute.        This confirms that the infection on your face was what we call \"shingles\".  It is a re-activation of the chicken pox virus.  It appeared, at your appointment earlier today, that you are recovering from this rash.      Please call with questions or contact us using [employer].    Sincerely,        Electronically signed by Yun Vyas MD       "

## 2021-06-21 NOTE — LETTER
Letter by Judy Mays MD at      Author: Judy Mays MD Service: -- Author Type: --    Filed:  Encounter Date: 1/22/2021 Status: (Other)         Cameron Jensen  [address] [place] [address]             January 22, 2021         Dear Mr. Jensen,    Below are the results from your recent visit:    Resulted Orders   HM2(CBC w/o Differential)   Result Value Ref Range    WBC 6.6 4.0 - 11.0 thou/uL    RBC 4.34 (L) 4.40 - 6.20 mill/uL    Hemoglobin 14.1 14.0 - 18.0 g/dL    Hematocrit 41.1 40.0 - 54.0 %    MCV 95 80 - 100 fL    MCH 32.5 27.0 - 34.0 pg    MCHC 34.3 32.0 - 36.0 g/dL    RDW 11.7 11.0 - 14.5 %    Platelets 115 (L) 140 - 440 thou/uL    MPV 6.9 (L) 7.0 - 10.0 fL   Comprehensive Metabolic Panel   Result Value Ref Range    Sodium 140 136 - 145 mmol/L    Potassium 3.7 3.5 - 5.0 mmol/L    Chloride 104 98 - 107 mmol/L    CO2 27 22 - 31 mmol/L    Anion Gap, Calculation 9 5 - 18 mmol/L    Glucose 127 (H) 70 - 125 mg/dL    BUN 14 8 - 22 mg/dL    Creatinine 0.83 0.70 - 1.30 mg/dL    GFR MDRD Af Amer >60 >60 mL/min/1.73m2    GFR MDRD Non Af Amer >60 >60 mL/min/1.73m2    Bilirubin, Total 1.0 0.0 - 1.0 mg/dL    Calcium 8.8 8.5 - 10.5 mg/dL    Protein, Total 7.5 6.0 - 8.0 g/dL    Albumin 4.0 3.5 - 5.0 g/dL    Alkaline Phosphatase 66 45 - 120 U/L    AST 19 0 - 40 U/L    ALT 16 0 - 45 U/L    Narrative    Fasting Glucose reference range is 70-99 mg/dL per  American Diabetes Association (ADA) guidelines.   C-Reactive Protein   Result Value Ref Range    CRP 0.8 0.0 - 0.8 mg/dL       Blood sugar was mildly elevated and need to be follow-up.  Platelet counts were low and need to be monitored over time.      Please call with questions or contact us using [employer].    Sincerely,        Electronically signed by Judy Mays MD

## 2021-06-21 NOTE — LETTER
Letter by Robel Muñiz MD at      Author: Robel Muñiz MD Service: -- Author Type: --    Filed:  Encounter Date: 2/16/2021 Status: (Other)         February 16, 2021     Yun Vyas MD  [address] [place] [id number]    Patient: Brenda Jensen   MR Number: [id number]   YOB: 1970   Date of Visit: 2/16/2021     Dear Dr. Lilliam MD:    Thank you for referring Brenda Jensen to me for evaluation. Below are the relevant portions of my assessment and plan of care.    If you have questions, please do not hesitate to call me. I look forward to following Brenda Barnes along with you.    Sincerely,        Robel Muñiz MD        CC  No Recipients    Progress Notes:

## 2021-06-21 NOTE — LETTER
Letter by Yun Vyas MD at      Author: Yun Vyas MD Service: -- Author Type: --    Filed:  Encounter Date: 1/22/2021 Status: (Other)         January 22, 2021     Patient: Cameron Jensen   YOB: 1970   Date of Visit: 1/22/2021       To Whom it May Concern:    Cameron Jensen was seen in my clinic on 1/22/2021.  He has missed work this week due to illness.  He has been seen twice in our clinic (1/20 and 1/22) and has follow up scheduled on Monday January 25, 2021.  We will re-evaluate his return to work status at that time.     If you have any questions or concerns, please don't hesitate to call.    Sincerely,         Electronically signed by Yun Vyas MD

## 2021-06-25 NOTE — TELEPHONE ENCOUNTER
RN cannot approve Refill Request    RN can NOT refill this medication med is not covered by policy/route to provider. Last office visit: 2/24/2021 Yun Vyas MD Last Physical: Visit date not found Last MTM visit: Visit date not found Last visit same specialty: 2/24/2021 Yun Vyas MD.  Next visit within 3 mo: Visit date not found  Next physical within 3 mo: Visit date not found      Carol Prieto, [facility] Connection Triage/Med Refill 6/14/2021    Requested Prescriptions   Pending Prescriptions Disp Refills     mupirocin (BACTROBAN) 2 % ointment 22 g 0     Sig: Apply to affected area 3 times daily       There is no refill protocol information for this order

## 2021-06-25 NOTE — TELEPHONE ENCOUNTER
I'm not sure why this individual needs this medication at this point?  What is he treating with the cream?

## 2021-06-25 NOTE — TELEPHONE ENCOUNTER
Reason for Call:  Medication or medication refill:    Do you use a [facility]?  Name of the pharmacy and phone number for the current request: [facility]    Name of the medication requested: rx for the cream requested is not covered by ins, can we fill the rx for ointment instead?    Other request:     Can we leave a detailed message on this number? No    Phone number patient can be reached at: Other phone number:  [phone]    Best Time: any    Call taken on 6/14/2021 at 12:44 PM by Jeanie Rm

## 2021-06-30 NOTE — PROGRESS NOTES
Progress Notes by Judy Mays MD at 1/20/2021 10:40 AM     Author: Judy Mays MD Service: -- Author Type: Physician    Filed: 1/22/2021  9:46 AM Encounter Date: 1/20/2021 Status: Signed    : Judy Mays MD (Physician)       OFFICE VISIT - FAMILY MEDICINE     ASSESSMENT AND PLAN     1. Facial cellulitis  HM2(CBC w/o Differential)    Comprehensive Metabolic Panel    C-Reactive Protein    cefTRIAXone (ROCEPHIN) injection 500 mg    cephalexin (KEFLEX) 500 MG capsule    mupirocin (BACTROBAN) 2 % ointment   Right Facial cellulitis with right lymphadenopathies, treatment discussed with the patient, he did receive Rocephin here for 500 mg he was observed for 30 minutes with no reaction, cephalexin was sent to his pharmacy to take as directed.  Topical mupirocin as directed that right side of his face, Tylenol as needed, follow-up in a couple of weeks.  [facility] a [facility], I did not have full access to his records, but I did not see any follow-up on that, is not currently taking any medication, will discuss further with the patient at his follow-up visit    CHIEF COMPLAINT   Facial Swelling    HPI   Cameron Jensen is a 50 y.o. male.  No Patient Care Coordination Note on file.    New patient with right facial swelling for the past few days, no specific trigger that patient is aware of, mild surrounding erythema.  He denies any fever chills.  No similar episode in the past.  Has not started any new medication.  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  Not currently taking any medication, no known allergies.  Patient denies any past medical history, but reviewing of his [facility] records show that he does have previous diagnosis of HIV, I did not have full access to his record, but I did not see any follow-up  on that, planning to discuss at his follow-up visit  He did sign for release of medical records, he stating that he was seen at [facility] in the past, but just recently changed insurance.      Review of Systems As per HPI, otherwise negative.    OBJECTIVE   BP (!) 172/99 (Patient Site: Left Arm, Patient Position: Sitting, Cuff Size: Adult Regular)   Pulse (!) 134   Resp 16   Wt 153 lb (69.4 kg)   SpO2 99%   Physical Exam   Constitutional: He is oriented to person, place, and time. He appears well-developed and well-nourished.   HENT:   Head: Normocephalic and atraumatic.       Neck: Normal range of motion. Neck supple. No JVD present. No neck rigidity. No tracheal deviation, no edema, no erythema and normal range of motion present. No thyromegaly present.       Cardiovascular: Normal rate, regular rhythm, normal heart sounds and intact distal pulses. Exam reveals no gallop and no friction rub.   No murmur heard.  Pulmonary/Chest: Effort normal and breath sounds normal. No respiratory distress. He has no wheezes. He has no rales.   Musculoskeletal:         General: No tenderness or edema.   Lymphadenopathy:     He has no cervical adenopathy.   Neurological: He is alert and oriented to person, place, and time. Coordination normal.   Psychiatric: He has a normal mood and affect. Judgment and thought content normal.       [facility]   No family history on file.  Social History     Socioeconomic History   ? Marital status: Single     Spouse name: Not on file   ? Number of children: Not on file   ? Years of education: Not on file   ? Highest education level: Not on file   Occupational History   ? Not on file   Social Needs   ? Financial resource strain: Not on file   ? Food insecurity     Worry: Not on file     Inability: Not on file   ? Transportation needs     Medical: Not on file     Non-medical: Not on file   Tobacco Use   ? Smoking status: Current Every Day Smoker   ? Smokeless tobacco: Current User   Substance  and Sexual Activity   ? Alcohol use: Not Currently   ? Drug use: Never   ? Sexual activity: Not on file   Lifestyle   ? Physical activity     Days per week: Not on file     Minutes per session: Not on file   ? Stress: Not on file   Relationships   ? Social connections     Talks on phone: Not on file     Gets together: Not on file     Attends Zoroastrianism service: Not on file     Active member of club or organization: Not on file     Attends meetings of clubs or organizations: Not on file     Relationship status: Not on file   ? Intimate partner violence     Fear of current or ex partner: Not on file     Emotionally abused: Not on file     Physically abused: Not on file     Forced sexual activity: Not on file   Other Topics Concern   ? Not on file   Social History Narrative   ? Not on file     Relevant history was reviewed with the patient today, unless noted in HPI, nothing is pertinent for this visit.  [facility]   There are no active problems to display for this patient.    No past surgical history on file.    RESULTS/CONSULTS (Lab/Rad)     Recent Results (from the past 168 hour(s))   HM2(CBC w/o Differential)   Result Value Ref Range    WBC 6.6 4.0 - 11.0 thou/uL    RBC 4.34 (L) 4.40 - 6.20 mill/uL    Hemoglobin 14.1 14.0 - 18.0 g/dL    Hematocrit 41.1 40.0 - 54.0 %    MCV 95 80 - 100 fL    MCH 32.5 27.0 - 34.0 pg    MCHC 34.3 32.0 - 36.0 g/dL    RDW 11.7 11.0 - 14.5 %    Platelets 115 (L) 140 - 440 thou/uL    MPV 6.9 (L) 7.0 - 10.0 fL   Comprehensive Metabolic Panel   Result Value Ref Range    Sodium 140 136 - 145 mmol/L    Potassium 3.7 3.5 - 5.0 mmol/L    Chloride 104 98 - 107 mmol/L    CO2 27 22 - 31 mmol/L    Anion Gap, Calculation 9 5 - 18 mmol/L    Glucose 127 (H) 70 - 125 mg/dL    BUN 14 8 - 22 mg/dL    Creatinine 0.83 0.70 - 1.30 mg/dL    GFR MDRD Af Amer >60 >60 mL/min/1.73m2    GFR MDRD Non Af Amer >60 >60 mL/min/1.73m2    Bilirubin, Total 1.0 0.0 - 1.0 mg/dL    Calcium 8.8 8.5 - 10.5 mg/dL    Protein, Total  7.5 6.0 - 8.0 g/dL    Albumin 4.0 3.5 - 5.0 g/dL    Alkaline Phosphatase 66 45 - 120 U/L    AST 19 0 - 40 U/L    ALT 16 0 - 45 U/L   C-Reactive Protein   Result Value Ref Range    CRP 0.8 0.0 - 0.8 mg/dL     No results found.  MEDICATIONS     No current outpatient medications on file prior to visit.     No current facility-administered medications on file prior to visit.        HEALTH MAINTENANCE / SCREENING   No data recorded, No data recorded,No data recorded    There is no immunization history on file for this patient.  Health Maintenance   Topic   ? HEPATITIS C SCREENING    ? PREVENTIVE CARE VISIT    ? Pneumococcal Vaccine: Pediatrics (0 to 5 Years) and At-Risk Patients (6 to 64 Years) (1 of 1 - PPSV23)   ? HIV SCREENING    ? TD 18+ HE    ? TDAP ADULT ONE TIME DOSE    ? ADVANCE CARE PLANNING    ? LIPID    ? INFLUENZA VACCINE RULE BASED (1)   ? ZOSTER VACCINES (1 of 2)   ? COLORECTAL CANCER SCREENING    ? HEPATITIS B VACCINES      Review of external notes as documented above     Diagnosis or treatment significantly limited by social determinants of health - Language barrier    20 minutes spent on the date of the encounter doing chart review, review of outside records, review of test results, interpretation of tests, patient visit and documentation     Judy Mays MD  Family Medicine, [facility]     This note was dictated using a voice recognition software.  Any grammatical or context distortion are unintentional and inherent to the software.

## 2021-06-30 NOTE — PROGRESS NOTES
Progress Notes by Yanira Watson CMA at 2/22/2021  1:45 PM     Author: Yanira Watson CMA Service: -- Author Type: Medical Assistant    Filed: 2/22/2021  4:37 PM Encounter Date: 2/22/2021 Status: Attested    : Yanira Watson CMA (Medical Assistant) Cosigner: Yun Vyas MD at 2/22/2021  6:16 PM    Attestation signed by Yun Vyas MD at 2/22/2021  6:16 PM    Continue same dose of medication.  BP is controlled.                 I met with [facility] Cameron Jensen at the request of Dr. Narvaez to recheck his blood pressure.  Blood pressure medications on the MAR were reviewed with patient.    Patient has taken all medications as per usual regimen: Yes  Patient reports tolerating them without any issues or concerns: No    Vitals:    02/22/21 1621   BP: 128/81   Patient Site: Left Arm   Patient Position: Sitting   Cuff Size: Adult Regular   Pulse: 85       Blood pressure was taken, previous encounter was reviewed, recorded blood pressure below 140/90.  Patient was discharged and the note will be sent to the provider for final review.

## 2021-07-04 NOTE — ADDENDUM NOTE
Addendum Note by Kristy Darling RN at 3/9/2021  3:00 PM     Author: Kristy Darling RN Service: -- Author Type: Registered Nurse    Filed: 3/10/2021  9:14 AM Encounter Date: 3/9/2021 Status: Signed    : Kristy Darling RN (Registered Nurse)    Addended by: KRISTY DARLING on: 3/10/2021 09:14 AM        Modules accepted: Orders

## 2021-09-03 ENCOUNTER — TELEPHONE (OUTPATIENT)
Dept: INFECTIOUS DISEASES | Facility: CLINIC | Age: 51
End: 2021-09-03

## 2021-09-03 DIAGNOSIS — B20 AIDS (ACQUIRED IMMUNE DEFICIENCY SYNDROME) (H): ICD-10-CM

## 2021-09-03 NOTE — TELEPHONE ENCOUNTER
Please call patient to schedule with another provider. Received refill request, unable to refill until established with new provider.     Thank you!

## 2021-09-09 NOTE — TELEPHONE ENCOUNTER
Date: 9/23/2021 Status: Scheduled   Time: 3:00 PM Length: 40   Visit Type: NEW [656] Copay: $0.00   Provider: Fabricio Frias MD Department: [facility]   Bill Area: Infectious Disease [facility]   Please bridge medication until seen.

## 2021-09-10 NOTE — TELEPHONE ENCOUNTER
RX sent to the wrong pharmacy. I called and cx the RX through [facility] and [facility]

## 2021-09-23 ENCOUNTER — OFFICE VISIT (OUTPATIENT)
Dept: INFECTIOUS DISEASES | Facility: CLINIC | Age: 51
End: 2021-09-23
Payer: COMMERCIAL

## 2021-09-23 ENCOUNTER — LAB (OUTPATIENT)
Dept: LAB | Facility: CLINIC | Age: 51
End: 2021-09-23

## 2021-09-23 VITALS
TEMPERATURE: 98.6 F | BODY MASS INDEX: 24.69 KG/M2 | WEIGHT: 161 LBS | SYSTOLIC BLOOD PRESSURE: 160 MMHG | DIASTOLIC BLOOD PRESSURE: 106 MMHG | HEART RATE: 60 BPM

## 2021-09-23 DIAGNOSIS — B20 AIDS (ACQUIRED IMMUNE DEFICIENCY SYNDROME) (H): ICD-10-CM

## 2021-09-23 LAB
ALBUMIN SERPL-MCNC: 4.1 G/DL (ref 3.5–5)
ALP SERPL-CCNC: 72 U/L (ref 45–120)
ALT SERPL W P-5'-P-CCNC: 24 U/L (ref 0–45)
ANION GAP SERPL CALCULATED.3IONS-SCNC: 12 MMOL/L (ref 5–18)
AST SERPL W P-5'-P-CCNC: 21 U/L (ref 0–40)
BASOPHILS # BLD AUTO: 0.1 10E3/UL (ref 0–0.2)
BASOPHILS NFR BLD AUTO: 1 %
BILIRUB SERPL-MCNC: 1.2 MG/DL (ref 0–1)
BUN SERPL-MCNC: 13 MG/DL (ref 8–22)
CALCIUM SERPL-MCNC: 9.7 MG/DL (ref 8.5–10.5)
CHLORIDE BLD-SCNC: 106 MMOL/L (ref 98–107)
CO2 SERPL-SCNC: 24 MMOL/L (ref 22–31)
CREAT SERPL-MCNC: 0.87 MG/DL (ref 0.7–1.3)
EOSINOPHIL # BLD AUTO: 0.6 10E3/UL (ref 0–0.7)
EOSINOPHIL NFR BLD AUTO: 7 %
ERYTHROCYTE [DISTWIDTH] IN BLOOD BY AUTOMATED COUNT: 12.4 % (ref 10–15)
GFR SERPL CREATININE-BSD FRML MDRD: >90 ML/MIN/1.73M2
GLUCOSE BLD-MCNC: 66 MG/DL (ref 70–125)
HCT VFR BLD AUTO: 45.5 % (ref 40–53)
HGB BLD-MCNC: 15.7 G/DL (ref 13.3–17.7)
IMM GRANULOCYTES # BLD: 0 10E3/UL
IMM GRANULOCYTES NFR BLD: 0 %
LYMPHOCYTES # BLD AUTO: 2.9 10E3/UL (ref 0.8–5.3)
LYMPHOCYTES NFR BLD AUTO: 37 %
MCH RBC QN AUTO: 32.7 PG (ref 26.5–33)
MCHC RBC AUTO-ENTMCNC: 34.5 G/DL (ref 31.5–36.5)
MCV RBC AUTO: 95 FL (ref 78–100)
MONOCYTES # BLD AUTO: 0.7 10E3/UL (ref 0–1.3)
MONOCYTES NFR BLD AUTO: 9 %
NEUTROPHILS # BLD AUTO: 3.6 10E3/UL (ref 1.6–8.3)
NEUTROPHILS NFR BLD AUTO: 46 %
PLATELET # BLD AUTO: 190 10E3/UL (ref 150–450)
POTASSIUM BLD-SCNC: 3.7 MMOL/L (ref 3.5–5)
PROT SERPL-MCNC: 7.5 G/DL (ref 6–8)
RBC # BLD AUTO: 4.8 10E6/UL (ref 4.4–5.9)
SODIUM SERPL-SCNC: 142 MMOL/L (ref 136–145)
WBC # BLD AUTO: 7.8 10E3/UL (ref 4–11)

## 2021-09-23 PROCEDURE — 99214 OFFICE O/P EST MOD 30 MIN: CPT | Performed by: INTERNAL MEDICINE

## 2021-09-23 PROCEDURE — 36415 COLL VENOUS BLD VENIPUNCTURE: CPT

## 2021-09-23 PROCEDURE — 85025 COMPLETE CBC W/AUTO DIFF WBC: CPT

## 2021-09-23 PROCEDURE — 86360 T CELL ABSOLUTE COUNT/RATIO: CPT

## 2021-09-23 PROCEDURE — 87536 HIV-1 QUANT&REVRSE TRNSCRPJ: CPT

## 2021-09-23 PROCEDURE — 86359 T CELLS TOTAL COUNT: CPT

## 2021-09-23 PROCEDURE — 80053 COMPREHEN METABOLIC PANEL: CPT

## 2021-09-23 NOTE — PROGRESS NOTES
Date of diagnosis:2002  Current ARV/HAART agents:BKT  Prophylaxis/Vaccine data:  Last VL 77  Last CD4 381    HPI/ROS:  Had shingles and a VL over 100K early 2021 so pt after many years as long term non progressor did consent to rx with BKT.  Doing well on it, did miss past 2 days as ran out of medication.  Using phone intrepreter no other compliants on ROS      Exam:    AF/VSS  No thrush  No LYMPHADENOPATHY  Neck supple  Cardiovascular regular rate and rhythm  Lungs clear to auscultation B  Abdomen soft, no masses  :  Deferred  Rectal:  deferrred  Skin:  No rash, lesions, masses noted      SALENA RICO from April 6:  Assessment:  1. HIV pos since 2002.  [place] [facility] [place]ly [place] Truvada and kaletra 6411-7033. None since.  2. Has since has stable CD4 counts around 400 and viral load <10,000 x 19 years.      CD4 2/21 was still 380 and VL 67,000 and HIV genotype w/o resistance.  3. Tested pos on IGRA test x 1 with three subsequent neg tests. Neg for HCV and Pos for HBsAB. Pos for HAV antibody. Toxo antibody pos.  Past tests for syphilis, GC, chlamydia all negative  4.  Has sex once a week with +- condom use.  5. Works in a [employer] company and lives alone.  6. Now with recent VZV L V1 near area and recent viral load of 110,000. Thus now progressed to AIDS.  No fever, wt loss, shortness of breath and cbc and cmc all normal.  7. Starte Biktarvy about 3/19 and tolerating it well     Recommendations:  1. cont Biktarvy   2. Use condoms until further notice.  3. Follow-up HIV VL today  4. RTC 2 mos.  _____________________________________________  Dr Altagracia RICO from today:    IMP:   HIV  Zoster this year      RECOMMEND:   Next visit need PSV23 and 13 prevnar  Refill times 10 months, fills here at [facility] pharmacy  Labs today  RTC 4 mos  Routine follow up HIV blood work  Refill medications prn  Continue present management    JEAN PAUL Frias MD  [place] [facility]  [facility]  [phone]

## 2021-09-24 LAB
CD3 CELLS # BLD: 2248 CELLS/UL (ref 603–2990)
CD3 CELLS NFR BLD: 68 % (ref 49–84)
CD3+CD4+ CELLS # BLD: 725 CELLS/UL (ref 441–2156)
CD3+CD4+ CELLS NFR BLD: 22 % (ref 28–63)
CD3+CD4+ CELLS/CD3+CD8+ CLL BLD: 0.51 % (ref 1.4–2.6)
CD3+CD8+ CELLS # BLD: 1413 CELLS/UL (ref 125–1312)
CD3+CD8+ CELLS NFR BLD: 43 % (ref 10–40)
HIV1 RNA # PLAS NAA DL=20: <20 COPIES/ML
HIV1 RNA SERPL NAA+PROBE-LOG#: <1.3 {LOG_COPIES}/ML
T CELL COMMENT: ABNORMAL

## 2021-10-18 ENCOUNTER — APPOINTMENT (OUTPATIENT)
Dept: INTERPRETER SERVICES | Facility: CLINIC | Age: 51
End: 2021-10-18
Payer: COMMERCIAL

## 2021-11-06 DIAGNOSIS — I10 BENIGN ESSENTIAL HYPERTENSION: Primary | ICD-10-CM

## 2021-11-08 NOTE — TELEPHONE ENCOUNTER
"Routing refill request to provider for review/approval because:  Labs out of range:  BP    Last Written Prescription Date:  2/24/21  Last Fill Quantity: 90,  # refills: 1   Last office visit provider:  2/24/21         Requested Prescriptions   Pending Prescriptions Disp Refills     metoprolol succinate ER (TOPROL-XL) 100 MG 24 hr tablet [Pharmacy Med Name: Metoprolol Succinate ER Oral Tablet Extended Release 24 Hour 100 MG] 90 tablet 0     Sig: TAKE ONE TABLET BY MOUTH ONE TIME DAILY       Beta-Blockers Protocol Failed - 11/6/2021 11:57 AM        Failed - Blood pressure under 140/90 in past 12 months     BP Readings from Last 3 Encounters:   09/23/21 (!) 160/106   04/06/21 120/70   03/09/21 122/88                 Failed - Medication is active on med list        Passed - Patient is age 6 or older        Passed - Recent (12 mo) or future (30 days) visit within the authorizing provider's specialty     Patient has had an office visit with the authorizing provider or a provider within the authorizing providers department within the previous 12 mos or has a future within next 30 days. See \"Patient Info\" tab in inbasket, or \"Choose Columns\" in Meds & Orders section of the refill encounter.                   Carol Prieto RN 11/08/21 11:03 AM  "

## 2021-11-09 RX ORDER — METOPROLOL SUCCINATE 100 MG/1
TABLET, EXTENDED RELEASE ORAL
Qty: 90 TABLET | Refills: 0 | Status: SHIPPED | OUTPATIENT
Start: 2021-11-09 | End: 2022-04-07

## 2022-01-20 ENCOUNTER — LAB (OUTPATIENT)
Dept: LAB | Facility: CLINIC | Age: 52
End: 2022-01-20

## 2022-01-20 ENCOUNTER — OFFICE VISIT (OUTPATIENT)
Dept: INFECTIOUS DISEASES | Facility: CLINIC | Age: 52
End: 2022-01-20
Payer: COMMERCIAL

## 2022-01-20 VITALS
TEMPERATURE: 98.8 F | SYSTOLIC BLOOD PRESSURE: 150 MMHG | BODY MASS INDEX: 24.99 KG/M2 | WEIGHT: 163 LBS | HEART RATE: 86 BPM | DIASTOLIC BLOOD PRESSURE: 98 MMHG

## 2022-01-20 DIAGNOSIS — B20 AIDS (H): ICD-10-CM

## 2022-01-20 DIAGNOSIS — B20 AIDS (H): Primary | ICD-10-CM

## 2022-01-20 LAB
ALBUMIN SERPL-MCNC: 4 G/DL (ref 3.5–5)
ALP SERPL-CCNC: 86 U/L (ref 45–120)
ALT SERPL W P-5'-P-CCNC: 19 U/L (ref 0–45)
ANION GAP SERPL CALCULATED.3IONS-SCNC: 10 MMOL/L (ref 5–18)
AST SERPL W P-5'-P-CCNC: 18 U/L (ref 0–40)
BASOPHILS # BLD AUTO: 0.1 10E3/UL (ref 0–0.2)
BASOPHILS NFR BLD AUTO: 1 %
BILIRUB SERPL-MCNC: 0.6 MG/DL (ref 0–1)
BUN SERPL-MCNC: 17 MG/DL (ref 8–22)
CALCIUM SERPL-MCNC: 9.4 MG/DL (ref 8.5–10.5)
CHLORIDE BLD-SCNC: 109 MMOL/L (ref 98–107)
CO2 SERPL-SCNC: 22 MMOL/L (ref 22–31)
CREAT SERPL-MCNC: 0.83 MG/DL (ref 0.7–1.3)
EOSINOPHIL # BLD AUTO: 0.6 10E3/UL (ref 0–0.7)
EOSINOPHIL NFR BLD AUTO: 6 %
ERYTHROCYTE [DISTWIDTH] IN BLOOD BY AUTOMATED COUNT: 12.7 % (ref 10–15)
GFR SERPL CREATININE-BSD FRML MDRD: >90 ML/MIN/1.73M2
GLUCOSE BLD-MCNC: 94 MG/DL (ref 70–125)
HCT VFR BLD AUTO: 44.9 % (ref 40–53)
HGB BLD-MCNC: 15.4 G/DL (ref 13.3–17.7)
IMM GRANULOCYTES # BLD: 0 10E3/UL
IMM GRANULOCYTES NFR BLD: 0 %
LYMPHOCYTES # BLD AUTO: 2.9 10E3/UL (ref 0.8–5.3)
LYMPHOCYTES NFR BLD AUTO: 31 %
MCH RBC QN AUTO: 32.9 PG (ref 26.5–33)
MCHC RBC AUTO-ENTMCNC: 34.3 G/DL (ref 31.5–36.5)
MCV RBC AUTO: 96 FL (ref 78–100)
MONOCYTES # BLD AUTO: 0.7 10E3/UL (ref 0–1.3)
MONOCYTES NFR BLD AUTO: 8 %
NEUTROPHILS # BLD AUTO: 5.1 10E3/UL (ref 1.6–8.3)
NEUTROPHILS NFR BLD AUTO: 55 %
PLATELET # BLD AUTO: 178 10E3/UL (ref 150–450)
POTASSIUM BLD-SCNC: 3.6 MMOL/L (ref 3.5–5)
PROT SERPL-MCNC: 7.5 G/DL (ref 6–8)
RBC # BLD AUTO: 4.68 10E6/UL (ref 4.4–5.9)
SODIUM SERPL-SCNC: 141 MMOL/L (ref 136–145)
WBC # BLD AUTO: 9.3 10E3/UL (ref 4–11)

## 2022-01-20 PROCEDURE — 86360 T CELL ABSOLUTE COUNT/RATIO: CPT

## 2022-01-20 PROCEDURE — 80053 COMPREHEN METABOLIC PANEL: CPT

## 2022-01-20 PROCEDURE — 99213 OFFICE O/P EST LOW 20 MIN: CPT | Performed by: INTERNAL MEDICINE

## 2022-01-20 PROCEDURE — 87536 HIV-1 QUANT&REVRSE TRNSCRPJ: CPT

## 2022-01-20 PROCEDURE — 85025 COMPLETE CBC W/AUTO DIFF WBC: CPT

## 2022-01-20 PROCEDURE — 86359 T CELLS TOTAL COUNT: CPT

## 2022-01-20 PROCEDURE — 36415 COLL VENOUS BLD VENIPUNCTURE: CPT

## 2022-01-20 NOTE — PROGRESS NOTES
Date of diagnosis:2002  Current ARV/HAART agents:BKT  Prophylaxis/Vaccine data:see today  Last VL UD  Last CD4 735    HPI/ROS:  In 11 months on BKT with good compliance his t cells are now over 700 and VL went from 119K copies to undetectable.  Via  no other complaints.  We will give Prevnar today and PSV 23 next time.  He is stable and non smoker.       Exam:    AF/VSS  No thrush  No LYMPHADENOPATHY  Neck supple  Cardiovascular regular rate and rhythm  Lungs clear to auscultation B  Abdomen soft, no masses  :  Deferred  Rectal:  deferrred  Skin:  No rash, lesions, masses noted    IMP:   HIV    RECOMMEND:   Prevnar  RTC 4-6 mos, continue BKT.  Ok with his other medications, reviewed with pt.  Routine follow up HIV blood work  Refill medications prn  Continue present management    JEAN PAUL Frias MD  [place] [facility]  [facility]  [phone]

## 2022-01-21 LAB
CD3 CELLS # BLD: 1841 CELLS/UL (ref 603–2990)
CD3 CELLS NFR BLD: 64 % (ref 49–84)
CD3+CD4+ CELLS # BLD: 684 CELLS/UL (ref 441–2156)
CD3+CD4+ CELLS NFR BLD: 24 % (ref 28–63)
CD3+CD4+ CELLS/CD3+CD8+ CLL BLD: 0.63 % (ref 1.4–2.6)
CD3+CD8+ CELLS # BLD: 1080 CELLS/UL (ref 125–1312)
CD3+CD8+ CELLS NFR BLD: 38 % (ref 10–40)
HIV1 RNA # PLAS NAA DL=20: NOT DETECTED COPIES/ML
T CELL COMMENT: ABNORMAL

## 2022-06-30 ENCOUNTER — OFFICE VISIT (OUTPATIENT)
Dept: INFECTIOUS DISEASES | Facility: CLINIC | Age: 52
End: 2022-06-30
Payer: COMMERCIAL

## 2022-06-30 VITALS
BODY MASS INDEX: 25.13 KG/M2 | DIASTOLIC BLOOD PRESSURE: 80 MMHG | WEIGHT: 163.9 LBS | HEART RATE: 76 BPM | SYSTOLIC BLOOD PRESSURE: 120 MMHG

## 2022-06-30 DIAGNOSIS — B20 AIDS (ACQUIRED IMMUNE DEFICIENCY SYNDROME) (H): Primary | ICD-10-CM

## 2022-06-30 LAB
ALBUMIN SERPL BCG-MCNC: 4.3 G/DL (ref 3.5–5.2)
ALP SERPL-CCNC: 70 U/L (ref 40–129)
ALT SERPL W P-5'-P-CCNC: 22 U/L (ref 10–50)
ANION GAP SERPL CALCULATED.3IONS-SCNC: 10 MMOL/L (ref 7–15)
AST SERPL W P-5'-P-CCNC: 25 U/L (ref 10–50)
BASOPHILS # BLD AUTO: 0.1 10E3/UL (ref 0–0.2)
BASOPHILS NFR BLD AUTO: 1 %
BILIRUB SERPL-MCNC: 0.7 MG/DL
BUN SERPL-MCNC: 16.6 MG/DL (ref 6–20)
CALCIUM SERPL-MCNC: 9.2 MG/DL (ref 8.6–10)
CHLORIDE SERPL-SCNC: 109 MMOL/L (ref 98–107)
CREAT SERPL-MCNC: 0.89 MG/DL (ref 0.67–1.17)
DEPRECATED HCO3 PLAS-SCNC: 26 MMOL/L (ref 22–29)
EOSINOPHIL # BLD AUTO: 0.6 10E3/UL (ref 0–0.7)
EOSINOPHIL NFR BLD AUTO: 7 %
ERYTHROCYTE [DISTWIDTH] IN BLOOD BY AUTOMATED COUNT: 12.7 % (ref 10–15)
GFR SERPL CREATININE-BSD FRML MDRD: >90 ML/MIN/1.73M2
GLUCOSE SERPL-MCNC: 68 MG/DL (ref 70–99)
HCT VFR BLD AUTO: 41.6 % (ref 40–53)
HGB BLD-MCNC: 14.4 G/DL (ref 13.3–17.7)
IMM GRANULOCYTES # BLD: 0 10E3/UL
IMM GRANULOCYTES NFR BLD: 0 %
LYMPHOCYTES # BLD AUTO: 3.1 10E3/UL (ref 0.8–5.3)
LYMPHOCYTES NFR BLD AUTO: 35 %
MCH RBC QN AUTO: 33.4 PG (ref 26.5–33)
MCHC RBC AUTO-ENTMCNC: 34.6 G/DL (ref 31.5–36.5)
MCV RBC AUTO: 97 FL (ref 78–100)
MONOCYTES # BLD AUTO: 0.6 10E3/UL (ref 0–1.3)
MONOCYTES NFR BLD AUTO: 7 %
NEUTROPHILS # BLD AUTO: 4.3 10E3/UL (ref 1.6–8.3)
NEUTROPHILS NFR BLD AUTO: 50 %
PLATELET # BLD AUTO: 180 10E3/UL (ref 150–450)
POTASSIUM SERPL-SCNC: 3.7 MMOL/L (ref 3.4–5.3)
PROT SERPL-MCNC: 7.4 G/DL (ref 6.4–8.3)
RBC # BLD AUTO: 4.31 10E6/UL (ref 4.4–5.9)
SODIUM SERPL-SCNC: 145 MMOL/L (ref 136–145)
WBC # BLD AUTO: 8.7 10E3/UL (ref 4–11)

## 2022-06-30 PROCEDURE — 86360 T CELL ABSOLUTE COUNT/RATIO: CPT | Performed by: INTERNAL MEDICINE

## 2022-06-30 PROCEDURE — 87536 HIV-1 QUANT&REVRSE TRNSCRPJ: CPT | Performed by: INTERNAL MEDICINE

## 2022-06-30 PROCEDURE — 85025 COMPLETE CBC W/AUTO DIFF WBC: CPT | Performed by: INTERNAL MEDICINE

## 2022-06-30 PROCEDURE — 99213 OFFICE O/P EST LOW 20 MIN: CPT | Performed by: INTERNAL MEDICINE

## 2022-06-30 PROCEDURE — 86359 T CELLS TOTAL COUNT: CPT | Performed by: INTERNAL MEDICINE

## 2022-06-30 PROCEDURE — 36415 COLL VENOUS BLD VENIPUNCTURE: CPT | Performed by: INTERNAL MEDICINE

## 2022-06-30 PROCEDURE — 80053 COMPREHEN METABOLIC PANEL: CPT | Performed by: INTERNAL MEDICINE

## 2022-06-30 RX ORDER — PNEUMOCOCCAL VACCINE POLYVALENT 25; 25; 25; 25; 25; 25; 25; 25; 25; 25; 25; 25; 25; 25; 25; 25; 25; 25; 25; 25; 25; 25; 25 UG/.5ML; UG/.5ML; UG/.5ML; UG/.5ML; UG/.5ML; UG/.5ML; UG/.5ML; UG/.5ML; UG/.5ML; UG/.5ML; UG/.5ML; UG/.5ML; UG/.5ML; UG/.5ML; UG/.5ML; UG/.5ML; UG/.5ML; UG/.5ML; UG/.5ML; UG/.5ML; UG/.5ML; UG/.5ML; UG/.5ML
0.5 INJECTION, SOLUTION INTRAMUSCULAR; SUBCUTANEOUS ONCE
Qty: 0.5 ML | Refills: 0 | Status: SHIPPED | OUTPATIENT
Start: 2022-06-30 | End: 2022-06-30

## 2022-06-30 NOTE — PROGRESS NOTES
Date of diagnosis:2002  Current ARV/HAART agents:BKT  Prophylaxis/Vaccine data:reviewed, giving PSV 23 today  Last VL UD  Last CD4 684    HPI/ROS:  Using [place]ar[place] interpret the pt is compliant, understands medications and no complaints today.         Exam:    AF/VSS  No thrush  No LYMPHADENOPATHY  Neck supple  Cardiovascular regular rate and rhythm  Lungs clear to auscultation B  Abdomen soft, no masses  :  Deferred  Rectal:  deferrred  Skin:  No rash, lesions, masses noted    IMP:   HIV  Clean genotype    RECOMMEND:   Routine follow up HIV blood work  Refill medications prn  See me 9 months  Continue present management    JEAN PAUL Frias MD  [place] [facility]  [facility]  [phone]

## 2022-07-01 LAB
CD3 CELLS # BLD: 1959 CELLS/UL (ref 603–2990)
CD3 CELLS NFR BLD: 67 % (ref 49–84)
CD3+CD4+ CELLS # BLD: 739 CELLS/UL (ref 441–2156)
CD3+CD4+ CELLS NFR BLD: 25 % (ref 28–63)
CD3+CD4+ CELLS/CD3+CD8+ CLL BLD: 0.64 % (ref 1.4–2.6)
CD3+CD8+ CELLS # BLD: 1159 CELLS/UL (ref 125–1312)
CD3+CD8+ CELLS NFR BLD: 40 % (ref 10–40)
HIV1 RNA # PLAS NAA DL=20: NOT DETECTED COPIES/ML
T CELL COMMENT: ABNORMAL

## 2022-10-11 DIAGNOSIS — B20 AIDS (ACQUIRED IMMUNE DEFICIENCY SYNDROME) (H): ICD-10-CM

## 2023-02-02 ENCOUNTER — APPOINTMENT (OUTPATIENT)
Dept: INTERPRETER SERVICES | Facility: CLINIC | Age: 53
End: 2023-02-02
Payer: COMMERCIAL

## 2023-03-01 ENCOUNTER — APPOINTMENT (OUTPATIENT)
Dept: INTERPRETER SERVICES | Facility: CLINIC | Age: 53
End: 2023-03-01
Payer: COMMERCIAL

## 2023-03-30 ENCOUNTER — LAB (OUTPATIENT)
Dept: LAB | Facility: CLINIC | Age: 53
End: 2023-03-30
Payer: COMMERCIAL

## 2023-03-30 ENCOUNTER — OFFICE VISIT (OUTPATIENT)
Dept: INFECTIOUS DISEASES | Facility: CLINIC | Age: 53
End: 2023-03-30
Payer: COMMERCIAL

## 2023-03-30 VITALS
DIASTOLIC BLOOD PRESSURE: 88 MMHG | HEART RATE: 88 BPM | BODY MASS INDEX: 24.46 KG/M2 | OXYGEN SATURATION: 97 % | TEMPERATURE: 99 F | WEIGHT: 159.5 LBS | SYSTOLIC BLOOD PRESSURE: 140 MMHG

## 2023-03-30 DIAGNOSIS — Z21 ASYMPTOMATIC HUMAN IMMUNODEFICIENCY VIRUS (HIV) INFECTION STATUS (H): Primary | ICD-10-CM

## 2023-03-30 DIAGNOSIS — B20 AIDS (ACQUIRED IMMUNE DEFICIENCY SYNDROME) (H): ICD-10-CM

## 2023-03-30 LAB
ALBUMIN SERPL BCG-MCNC: 4.4 G/DL (ref 3.5–5.2)
ALP SERPL-CCNC: 88 U/L (ref 40–129)
ALT SERPL W P-5'-P-CCNC: 25 U/L (ref 10–50)
ANION GAP SERPL CALCULATED.3IONS-SCNC: 12 MMOL/L (ref 7–15)
AST SERPL W P-5'-P-CCNC: 26 U/L (ref 10–50)
BASOPHILS # BLD AUTO: 0.1 10E3/UL (ref 0–0.2)
BASOPHILS NFR BLD AUTO: 1 %
BILIRUB SERPL-MCNC: 0.5 MG/DL
BUN SERPL-MCNC: 15.4 MG/DL (ref 6–20)
CALCIUM SERPL-MCNC: 9.6 MG/DL (ref 8.6–10)
CHLORIDE SERPL-SCNC: 104 MMOL/L (ref 98–107)
CREAT SERPL-MCNC: 0.9 MG/DL (ref 0.67–1.17)
DEPRECATED HCO3 PLAS-SCNC: 25 MMOL/L (ref 22–29)
EOSINOPHIL # BLD AUTO: 0.6 10E3/UL (ref 0–0.7)
EOSINOPHIL NFR BLD AUTO: 7 %
ERYTHROCYTE [DISTWIDTH] IN BLOOD BY AUTOMATED COUNT: 12.3 % (ref 10–15)
GFR SERPL CREATININE-BSD FRML MDRD: >90 ML/MIN/1.73M2
GLUCOSE SERPL-MCNC: 76 MG/DL (ref 70–99)
HCT VFR BLD AUTO: 44.4 % (ref 40–53)
HGB BLD-MCNC: 15.5 G/DL (ref 13.3–17.7)
IMM GRANULOCYTES # BLD: 0 10E3/UL
IMM GRANULOCYTES NFR BLD: 0 %
LYMPHOCYTES # BLD AUTO: 2.9 10E3/UL (ref 0.8–5.3)
LYMPHOCYTES NFR BLD AUTO: 35 %
MCH RBC QN AUTO: 32.8 PG (ref 26.5–33)
MCHC RBC AUTO-ENTMCNC: 34.9 G/DL (ref 31.5–36.5)
MCV RBC AUTO: 94 FL (ref 78–100)
MONOCYTES # BLD AUTO: 0.7 10E3/UL (ref 0–1.3)
MONOCYTES NFR BLD AUTO: 8 %
NEUTROPHILS # BLD AUTO: 4 10E3/UL (ref 1.6–8.3)
NEUTROPHILS NFR BLD AUTO: 49 %
PLATELET # BLD AUTO: 193 10E3/UL (ref 150–450)
POTASSIUM SERPL-SCNC: 3.8 MMOL/L (ref 3.4–5.3)
PROT SERPL-MCNC: 7.7 G/DL (ref 6.4–8.3)
RBC # BLD AUTO: 4.73 10E6/UL (ref 4.4–5.9)
SODIUM SERPL-SCNC: 141 MMOL/L (ref 136–145)
WBC # BLD AUTO: 8.2 10E3/UL (ref 4–11)

## 2023-03-30 PROCEDURE — 99213 OFFICE O/P EST LOW 20 MIN: CPT | Performed by: INTERNAL MEDICINE

## 2023-03-30 PROCEDURE — 85025 COMPLETE CBC W/AUTO DIFF WBC: CPT

## 2023-03-30 PROCEDURE — 36415 COLL VENOUS BLD VENIPUNCTURE: CPT

## 2023-03-30 PROCEDURE — 86359 T CELLS TOTAL COUNT: CPT

## 2023-03-30 PROCEDURE — 80053 COMPREHEN METABOLIC PANEL: CPT

## 2023-03-30 PROCEDURE — 87536 HIV-1 QUANT&REVRSE TRNSCRPJ: CPT

## 2023-03-30 PROCEDURE — 86360 T CELL ABSOLUTE COUNT/RATIO: CPT

## 2023-03-30 NOTE — PROGRESS NOTES
Date of diagnosis:2002  Current ARV/HAART agents:BKT  Prophylaxis/Vaccine data:reviewed,   Last VL UD  Last CD4 739    HPI/ROS:stable.  No issues with medication.   Pt is smoker.    No issues with HAART today.       Exam:    AF/VSS  No thrush  No LYMPHADENOPATHY  Neck supple  Cardiovascular regular rate and rhythm  Lungs clear to auscultation B  Abdomen soft, no masses  :  Deferred  Rectal:  deferrred  Skin:  No rash, lesions, masses noted    IMP:   HIV    RECOMMEND:   Routine follow up HIV blood work  Refill medications prn  Needs prevnar   Continue present management    JEAN PAUL Frias MD  [place] [facility]  [facility]  [phone]

## 2023-03-31 LAB
CD3 CELLS # BLD: 1952 CELLS/UL (ref 603–2990)
CD3 CELLS NFR BLD: 67 % (ref 49–84)
CD3+CD4+ CELLS # BLD: 668 CELLS/UL (ref 441–2156)
CD3+CD4+ CELLS NFR BLD: 23 % (ref 28–63)
CD3+CD4+ CELLS/CD3+CD8+ CLL BLD: 0.56 % (ref 1.4–2.6)
CD3+CD8+ CELLS # BLD: 1191 CELLS/UL (ref 125–1312)
CD3+CD8+ CELLS NFR BLD: 41 % (ref 10–40)
T CELL COMMENT: ABNORMAL

## 2023-04-01 LAB — HIV1 RNA # PLAS NAA DL=20: NOT DETECTED COPIES/ML

## 2023-04-03 ENCOUNTER — PHARMACY VISIT (OUTPATIENT)
Dept: ADMINISTRATIVE | Facility: CLINIC | Age: 53
End: 2023-04-03
Payer: COMMERCIAL

## 2023-04-03 NOTE — PROGRESS NOTES
HIV Clinical Follow Up Assessment   Completed on  15:17:52 [facility], by Thi Mckeon       Patient  Patient Name: AGUSTIN HEARD  :   EHR ID: [id number]       Activity Date      Activity Medications    BIKTARVY        Care Details    What are the patient's goals for this therapy?   ? Undetectable      Is the patient meeting treatment goals?   ? Yes      Select patient's HIV therapy type:   ? HIV Treatment    Does the patient report viral suppression at their last lab visit?   ? Yes      Summary Notes     Unable to reach Agustin Barnes for assessment this quarter after a total of 7 call attempts.   Current Regimen: Biktarvy.     Adherence: PDC = 0.83. Routinely requires several outreach attempts in order to set up the refill for him each quarter. Refill finally set up once clinician reached out to alternate contact, his niece Ida.     HIV: Patient was seen in clinic on 3/30/23 for routine ID follow-up with Dr. Frias. No issues identified at this visit. Labs all looked stable - viral load undetectable, abs CD4 668 cells/ul, SCr 0.89 (eGFR >90 mL/min).     Follow up: Continue quarterly outreach to ensure he isn't missing doses and has adequate supply on hand.       Thi Mckeon, PharmD  Therapy Management Pharmacist  [facility]  [facility]

## 2023-05-24 ENCOUNTER — APPOINTMENT (OUTPATIENT)
Dept: INTERPRETER SERVICES | Facility: CLINIC | Age: 53
End: 2023-05-24
Payer: COMMERCIAL

## 2023-05-26 ENCOUNTER — APPOINTMENT (OUTPATIENT)
Dept: INTERPRETER SERVICES | Facility: CLINIC | Age: 53
End: 2023-05-26
Payer: COMMERCIAL

## 2023-06-09 ENCOUNTER — APPOINTMENT (OUTPATIENT)
Dept: INTERPRETER SERVICES | Facility: CLINIC | Age: 53
End: 2023-06-09
Payer: COMMERCIAL

## 2023-06-09 ENCOUNTER — PHARMACY VISIT (OUTPATIENT)
Dept: ADMINISTRATIVE | Facility: CLINIC | Age: 53
End: 2023-06-09
Payer: COMMERCIAL

## 2023-06-09 NOTE — PROGRESS NOTES
HIV Clinical Follow Up Assessment   Completed on  20:13:22 [facility], by Thi Mckeon       Patient  Patient Name: AGUSTIN JENSEN  :   EHR ID: [id number]       Activity Date      Activity Medications    BIKTARVY        Care Details    What are the patient's goals for this therapy?   ? Undetectable      Is the patient meeting treatment goals?   ? Yes      Select patient's HIV therapy type:   ? HIV Treatment    Does the patient report viral suppression at their last lab visit?   ? Yes       Summary Notes    Spoke with Agustin Jensen via Nepali  for assessment. Current Regimen: Biktarvy.   Med/Allergy Changes: None reported   Tolerability: Denies SEs     Adherence: PDC = 0.84. Denies missed doses. Patient has routinely been extremely difficult to get a hold of for refills of his Biktarvy. For the past two 90 day-supply refills, we've had to contact his niece, Ida, in order to get a hold of him to place the refill order. I called Ida today as my 5th attempt at outreach who told me a good time to reach the patient. We reviewed hours of the pharmacy and when to call back for refills (before 7pm) and that he prefers calls after 3pm. He has 2 tablets left today, so we are rushing out a delivery to him tonight to ensure he does not miss any doses.     HIV: Says everything is going well for him. He has an appointment scheduled in October for his every 6 month follow-up with ID. No questions today.     Follow up: 3 months       Thi Mckeon, PharmD  Therapy Management Pharmacist  [facility]  [facility]

## 2023-08-28 ENCOUNTER — APPOINTMENT (OUTPATIENT)
Dept: INTERPRETER SERVICES | Facility: CLINIC | Age: 53
End: 2023-08-28
Payer: COMMERCIAL

## 2023-09-22 ENCOUNTER — TELEPHONE (OUTPATIENT)
Dept: INFECTIOUS DISEASES | Facility: CLINIC | Age: 53
End: 2023-09-22
Payer: COMMERCIAL

## 2023-09-22 NOTE — TELEPHONE ENCOUNTER
Health Call Center    Phone Message    May a detailed message be left on voicemail: yes     Reason for Call: Medication Question or concern regarding medication   Prescription Clarification  Name of Medication: bictegravir-emtricitabine-tenofovir (BIKTARVY) -25 MG per tablet   Prescribing Provider: Fabricio Frias MD    Pharmacy:   [facility] - [place], [place] - [address]      What on the order needs clarification? Thi states they have been unable to reach the pt for refills. Per the pharmacist they have left 5 voicemails for him & 5 for his niece (preferred emergency contact), but neither have responded. She also states that the pt should have run out of meds after 9/9, so they are very overdue. Please reach out to pt for follow-up. Thank you.       Action Taken: Other: ID    Travel Screening: Not Applicable

## 2023-10-05 ENCOUNTER — OFFICE VISIT (OUTPATIENT)
Dept: INFECTIOUS DISEASES | Facility: CLINIC | Age: 53
End: 2023-10-05
Payer: COMMERCIAL

## 2023-10-05 ENCOUNTER — PHARMACY VISIT (OUTPATIENT)
Dept: PHARMACY | Facility: CLINIC | Age: 53
End: 2023-10-05

## 2023-10-05 ENCOUNTER — LAB (OUTPATIENT)
Dept: LAB | Facility: CLINIC | Age: 53
End: 2023-10-05
Payer: COMMERCIAL

## 2023-10-05 VITALS
HEART RATE: 73 BPM | TEMPERATURE: 98 F | OXYGEN SATURATION: 98 % | BODY MASS INDEX: 24.41 KG/M2 | DIASTOLIC BLOOD PRESSURE: 76 MMHG | SYSTOLIC BLOOD PRESSURE: 128 MMHG | WEIGHT: 159.2 LBS

## 2023-10-05 DIAGNOSIS — B20 AIDS (ACQUIRED IMMUNE DEFICIENCY SYNDROME) (H): Primary | ICD-10-CM

## 2023-10-05 DIAGNOSIS — B20 AIDS (ACQUIRED IMMUNE DEFICIENCY SYNDROME) (H): ICD-10-CM

## 2023-10-05 LAB
ALBUMIN SERPL BCG-MCNC: 4.2 G/DL (ref 3.5–5.2)
ALP SERPL-CCNC: 80 U/L (ref 40–129)
ALT SERPL W P-5'-P-CCNC: 16 U/L (ref 0–70)
ANION GAP SERPL CALCULATED.3IONS-SCNC: 12 MMOL/L (ref 7–15)
AST SERPL W P-5'-P-CCNC: 24 U/L (ref 0–45)
BASO+EOS+MONOS # BLD AUTO: ABNORMAL 10*3/UL
BASO+EOS+MONOS NFR BLD AUTO: ABNORMAL %
BASOPHILS # BLD AUTO: 0.1 10E3/UL (ref 0–0.2)
BASOPHILS NFR BLD AUTO: 1 %
BILIRUB SERPL-MCNC: 0.4 MG/DL
BUN SERPL-MCNC: 14.6 MG/DL (ref 6–20)
CALCIUM SERPL-MCNC: 9.1 MG/DL (ref 8.6–10)
CHLORIDE SERPL-SCNC: 107 MMOL/L (ref 98–107)
CREAT SERPL-MCNC: 0.86 MG/DL (ref 0.67–1.17)
DEPRECATED HCO3 PLAS-SCNC: 22 MMOL/L (ref 22–29)
EGFRCR SERPLBLD CKD-EPI 2021: >90 ML/MIN/1.73M2
EOSINOPHIL # BLD AUTO: 0.7 10E3/UL (ref 0–0.7)
EOSINOPHIL NFR BLD AUTO: 10 %
ERYTHROCYTE [DISTWIDTH] IN BLOOD BY AUTOMATED COUNT: 12.8 % (ref 10–15)
GLUCOSE SERPL-MCNC: 97 MG/DL (ref 70–99)
HCT VFR BLD AUTO: 42.4 % (ref 40–53)
HGB BLD-MCNC: 15 G/DL (ref 13.3–17.7)
IMM GRANULOCYTES # BLD: 0 10E3/UL
IMM GRANULOCYTES NFR BLD: 0 %
LYMPHOCYTES # BLD AUTO: 2.1 10E3/UL (ref 0.8–5.3)
LYMPHOCYTES NFR BLD AUTO: 31 %
MCH RBC QN AUTO: 33.3 PG (ref 26.5–33)
MCHC RBC AUTO-ENTMCNC: 35.4 G/DL (ref 31.5–36.5)
MCV RBC AUTO: 94 FL (ref 78–100)
MONOCYTES # BLD AUTO: 0.7 10E3/UL (ref 0–1.3)
MONOCYTES NFR BLD AUTO: 10 %
NEUTROPHILS # BLD AUTO: 3.3 10E3/UL (ref 1.6–8.3)
NEUTROPHILS NFR BLD AUTO: 49 %
PLATELET # BLD AUTO: 157 10E3/UL (ref 150–450)
POTASSIUM SERPL-SCNC: 3.5 MMOL/L (ref 3.4–5.3)
PROT SERPL-MCNC: 7.1 G/DL (ref 6.4–8.3)
RBC # BLD AUTO: 4.51 10E6/UL (ref 4.4–5.9)
SODIUM SERPL-SCNC: 141 MMOL/L (ref 135–145)
WBC # BLD AUTO: 6.8 10E3/UL (ref 4–11)

## 2023-10-05 PROCEDURE — 87536 HIV-1 QUANT&REVRSE TRNSCRPJ: CPT

## 2023-10-05 PROCEDURE — 86359 T CELLS TOTAL COUNT: CPT

## 2023-10-05 PROCEDURE — 85025 COMPLETE CBC W/AUTO DIFF WBC: CPT

## 2023-10-05 PROCEDURE — 90471 IMMUNIZATION ADMIN: CPT | Performed by: INTERNAL MEDICINE

## 2023-10-05 PROCEDURE — 80053 COMPREHEN METABOLIC PANEL: CPT

## 2023-10-05 PROCEDURE — 90677 PCV20 VACCINE IM: CPT | Performed by: INTERNAL MEDICINE

## 2023-10-05 PROCEDURE — 86360 T CELL ABSOLUTE COUNT/RATIO: CPT

## 2023-10-05 PROCEDURE — 99213 OFFICE O/P EST LOW 20 MIN: CPT | Mod: 25 | Performed by: INTERNAL MEDICINE

## 2023-10-05 PROCEDURE — 36415 COLL VENOUS BLD VENIPUNCTURE: CPT

## 2023-10-05 NOTE — PROGRESS NOTES
Date of diagnosis:2002  Current ARV/HAART agents:BKT  Prophylaxis/Vaccine data:reviewed  Last VL UD  Last CD4 668    HPI/ROS:  Works I think as a .  Smoker.   Agrees will take Prev20 today.  No other issues.  Ran out of BKT the past 2 days.       Exam:    AF/VSS  No thrush  No LYMPHADENOPATHY  Neck supple  Cardiovascular regular rate and rhythm  Lungs clear to auscultation B  Abdomen soft, no masses  :  Deferred  Rectal:  deferrred  Skin:  No rash, lesions, masses noted    IMP:   HIV  Tobaccoism    RECOMMEND:   Prevnar  RTC 4 months  Routine follow up HIV blood work  Refill medications prn  Continue present management    JEAN PAUL Frias MD  [place] [facility]  [facility]  [phone]

## 2023-10-05 NOTE — PROGRESS NOTES
HIV Clinical Follow Up Assessment      Activity Medications    BIKTARVY        Summary Notes  Was able to reach patient after about 10 attempts over the last month to his phone and his niece's phone. Spoke with him using an .     Current Regimen: Biktarvy once daily     Med/Allergy Changes: Hasnt filled metoprolol in a while but agrees to fill it today.     Adherence: PDC = 0.83. Pt should have ran out 1 month ago but just ran out of tablets yesterday. I am concerned he is missing more than he realizes. He agrees to use pill box, sending a free one out with his Biktarvy and metoprolol today.     Tolerability: Denies SE     HIV: Pt has virtual visit with MD today. Last labs in March were undetectable, CD4 668 cell/uL. No questions today.     Follow up: with next refill    Madai Melendez, AmanD  Therapy Management Pharmacist  [facility]

## 2023-10-06 LAB
CD3 CELLS # BLD: 1487 CELLS/UL (ref 603–2990)
CD3 CELLS NFR BLD: 65 % (ref 49–84)
CD3+CD4+ CELLS # BLD: 676 CELLS/UL (ref 441–2156)
CD3+CD4+ CELLS NFR BLD: 30 % (ref 28–63)
CD3+CD4+ CELLS/CD3+CD8+ CLL BLD: 0.88 % (ref 1.4–2.6)
CD3+CD8+ CELLS # BLD: 769 CELLS/UL (ref 125–1312)
CD3+CD8+ CELLS NFR BLD: 34 % (ref 10–40)
T CELL COMMENT: ABNORMAL

## 2023-10-07 LAB
HIV1 RNA # PLAS NAA DL=20: <20 COPIES/ML
HIV1 RNA SERPL NAA+PROBE-LOG#: <1.3 {LOG_COPIES}/ML

## 2023-12-27 ENCOUNTER — PHARMACY VISIT (OUTPATIENT)
Dept: PHARMACY | Facility: CLINIC | Age: 53
End: 2023-12-27
Payer: COMMERCIAL

## 2023-12-27 DIAGNOSIS — B20 AIDS (ACQUIRED IMMUNE DEFICIENCY SYNDROME) (H): ICD-10-CM

## 2023-12-27 NOTE — PROGRESS NOTES
HIV Clinical Follow Up Assessment    Activity Medications    BIKTARVY     Summary Notes  Spoke with Brenda Barnes with  for assessment. Current Regimen: Biktarvy once daily     Med/Allergy Changes: None     Adherence: PDC = 0.87. He says he has about 7-10 tablets left of Biktarvy. That lines up very well with last delivery date, seems like things are going well with using the pill box. Pt is out of metoprolol again. Discussed that he needs to see PCP for more refills (only 30 days left on this Rx and notes say he needs appt). Also discussed he needs to reach out to pharmacy when he is running low to order another refill.     Tolerability: Denies SE     HIV: Labs from 10/5/23 show VL <20 copies, CD4 count 676 cells, liver and renal function appropriate. Pt has no questions today.     Follow up: quarterly       Aman GoodrichD  Therapy Management Pharmacist  [facility]

## 2024-02-08 ENCOUNTER — LAB (OUTPATIENT)
Dept: LAB | Facility: CLINIC | Age: 54
End: 2024-02-08
Payer: COMMERCIAL

## 2024-02-08 ENCOUNTER — OFFICE VISIT (OUTPATIENT)
Dept: INFECTIOUS DISEASES | Facility: CLINIC | Age: 54
End: 2024-02-08
Payer: COMMERCIAL

## 2024-02-08 VITALS
BODY MASS INDEX: 24.38 KG/M2 | WEIGHT: 159 LBS | OXYGEN SATURATION: 96 % | TEMPERATURE: 98 F | DIASTOLIC BLOOD PRESSURE: 106 MMHG | SYSTOLIC BLOOD PRESSURE: 176 MMHG | HEART RATE: 92 BPM

## 2024-02-08 DIAGNOSIS — B20 AIDS (ACQUIRED IMMUNE DEFICIENCY SYNDROME) (H): ICD-10-CM

## 2024-02-08 DIAGNOSIS — I10 BENIGN ESSENTIAL HYPERTENSION: ICD-10-CM

## 2024-02-08 DIAGNOSIS — B20 AIDS (ACQUIRED IMMUNE DEFICIENCY SYNDROME) (H): Primary | ICD-10-CM

## 2024-02-08 LAB
BASOPHILS # BLD AUTO: 0 10E3/UL (ref 0–0.2)
BASOPHILS NFR BLD AUTO: 0 %
EOSINOPHIL # BLD AUTO: 0.5 10E3/UL (ref 0–0.7)
EOSINOPHIL NFR BLD AUTO: 6 %
ERYTHROCYTE [DISTWIDTH] IN BLOOD BY AUTOMATED COUNT: 12.7 % (ref 10–15)
HCT VFR BLD AUTO: 43.1 % (ref 40–53)
HGB BLD-MCNC: 15.1 G/DL (ref 13.3–17.7)
IMM GRANULOCYTES # BLD: 0 10E3/UL
IMM GRANULOCYTES NFR BLD: 0 %
LYMPHOCYTES # BLD AUTO: 2.8 10E3/UL (ref 0.8–5.3)
LYMPHOCYTES NFR BLD AUTO: 33 %
MCH RBC QN AUTO: 33.3 PG (ref 26.5–33)
MCHC RBC AUTO-ENTMCNC: 35 G/DL (ref 31.5–36.5)
MCV RBC AUTO: 95 FL (ref 78–100)
MONOCYTES # BLD AUTO: 0.6 10E3/UL (ref 0–1.3)
MONOCYTES NFR BLD AUTO: 7 %
NEUTROPHILS # BLD AUTO: 4.6 10E3/UL (ref 1.6–8.3)
NEUTROPHILS NFR BLD AUTO: 54 %
PLATELET # BLD AUTO: 170 10E3/UL (ref 150–450)
RBC # BLD AUTO: 4.54 10E6/UL (ref 4.4–5.9)
WBC # BLD AUTO: 8.6 10E3/UL (ref 4–11)

## 2024-02-08 PROCEDURE — G2211 COMPLEX E/M VISIT ADD ON: HCPCS | Performed by: INTERNAL MEDICINE

## 2024-02-08 PROCEDURE — 36415 COLL VENOUS BLD VENIPUNCTURE: CPT

## 2024-02-08 PROCEDURE — 86360 T CELL ABSOLUTE COUNT/RATIO: CPT

## 2024-02-08 PROCEDURE — 85025 COMPLETE CBC W/AUTO DIFF WBC: CPT

## 2024-02-08 PROCEDURE — 87536 HIV-1 QUANT&REVRSE TRNSCRPJ: CPT

## 2024-02-08 PROCEDURE — 99213 OFFICE O/P EST LOW 20 MIN: CPT | Performed by: INTERNAL MEDICINE

## 2024-02-08 PROCEDURE — 80053 COMPREHEN METABOLIC PANEL: CPT

## 2024-02-08 PROCEDURE — 86359 T CELLS TOTAL COUNT: CPT

## 2024-02-08 RX ORDER — METOPROLOL SUCCINATE 100 MG/1
TABLET, EXTENDED RELEASE ORAL
Qty: 30 TABLET | Refills: 5 | Status: SHIPPED | OUTPATIENT
Start: 2024-02-08 | End: 2024-08-28

## 2024-02-08 NOTE — PROGRESS NOTES
Date of diagnosis:2002  Current ARV/HAART agents:BKT  Prophylaxis/Vaccine data:reviewed  Last VL UD  Last CD4 668        HPI/ROS:  Pt with hiv this is stable.  Hypertensive and needs refills.  Also smoker, heavy I think.    Used Swedish  no other issues.         Exam:    AF/VSS  No thrush  No LYMPHADENOPATHY  Neck supple  Cardiovascular regular rate and rhythm  Lungs clear to auscultation B  Abdomen soft, no masses  :  Deferred  Rectal:  deferrred  Skin:  No rash, lesions, masses noted         IMP:   HIV  Tobaccoism  /106    RECOMMEND:   He got prevnar before (prev20)  RTC 9 months  Routine follow up HIV blood work  Refill medications prn  He should see his primary about the blood pressure also  Continue present management        JEAN PAUL Frias MD  [place] [facility]  [facility]  [phone]

## 2024-02-09 LAB
ALBUMIN SERPL BCG-MCNC: 4.4 G/DL (ref 3.5–5.2)
ALP SERPL-CCNC: 81 U/L (ref 40–150)
ALT SERPL W P-5'-P-CCNC: 28 U/L (ref 0–70)
ANION GAP SERPL CALCULATED.3IONS-SCNC: 10 MMOL/L (ref 7–15)
AST SERPL W P-5'-P-CCNC: 27 U/L (ref 0–45)
BILIRUB SERPL-MCNC: 0.5 MG/DL
BUN SERPL-MCNC: 17.8 MG/DL (ref 6–20)
CALCIUM SERPL-MCNC: 9.1 MG/DL (ref 8.6–10)
CD3 CELLS # BLD: 1895 CELLS/UL (ref 603–2990)
CD3 CELLS NFR BLD: 67 % (ref 49–84)
CD3+CD4+ CELLS # BLD: 766 CELLS/UL (ref 441–2156)
CD3+CD4+ CELLS NFR BLD: 27 % (ref 28–63)
CD3+CD4+ CELLS/CD3+CD8+ CLL BLD: 0.73 % (ref 1.4–2.6)
CD3+CD8+ CELLS # BLD: 1053 CELLS/UL (ref 125–1312)
CD3+CD8+ CELLS NFR BLD: 37 % (ref 10–40)
CHLORIDE SERPL-SCNC: 106 MMOL/L (ref 98–107)
CREAT SERPL-MCNC: 0.9 MG/DL (ref 0.67–1.17)
DEPRECATED HCO3 PLAS-SCNC: 25 MMOL/L (ref 22–29)
EGFRCR SERPLBLD CKD-EPI 2021: >90 ML/MIN/1.73M2
GLUCOSE SERPL-MCNC: 84 MG/DL (ref 70–99)
POTASSIUM SERPL-SCNC: 3.6 MMOL/L (ref 3.4–5.3)
PROT SERPL-MCNC: 7.4 G/DL (ref 6.4–8.3)
SODIUM SERPL-SCNC: 141 MMOL/L (ref 135–145)
T CELL COMMENT: ABNORMAL

## 2024-02-10 LAB
HIV1 RNA # PLAS NAA DL=20: 61 COPIES/ML
HIV1 RNA SERPL NAA+PROBE-LOG#: 1.8 {LOG_COPIES}/ML

## 2024-03-19 ENCOUNTER — PHARMACY VISIT (OUTPATIENT)
Dept: ADMINISTRATIVE | Facility: CLINIC | Age: 54
End: 2024-03-19
Payer: COMMERCIAL

## 2024-03-19 NOTE — PROGRESS NOTES
HIV Clinical Follow Up Assessment    Spoke with Brenda Barnes via Wolof  for assessment. Current Regimen: Biktarvy.     Med/Allergy Changes: None reported   Tolerability: Denies SEs     Adherence: PDC = 0.90. Denies missed doses of Biktarvy. Notably, we could only send out a 30 day supply of metoprolol back at the end of December because he needed an appointment for further refills. He was counseled at that time to call us once he had his appointment to reorder the medication, but he never did. Filled a 90 day supply for him today and is aligned with 90 day supply of Biktarvy. Blood pressure at recent clinic visit was extremely elevated at 176/106.     HIV: States he's doing well with no questions today. Last saw Dr. Frias in clinic on 2/8/24 and was found to be stable from an ID perspective. Was told to follow up with primary for hypertension.     Labs from 2/8/24: HIV viral load 61 (elevated from last lab drawn in Oct 2023), abs CD4 766, SCr 0.9 (eGFR >90)     Follow up: 3 months       Care Details    What are the patient's goals for this therapy?   ? Undetectable      Is the patient meeting treatment goals?   ? Progressing towards goals      Select patient's HIV therapy type:   ? HIV Treatment    Does the patient report viral suppression at their last lab visit?   ? Yes          Thi Mckeon, PharmD  Therapy Management Pharmacist  [facility]  [facility]

## 2024-06-17 ENCOUNTER — APPOINTMENT (OUTPATIENT)
Dept: INTERPRETER SERVICES | Facility: CLINIC | Age: 54
End: 2024-06-17
Payer: COMMERCIAL

## 2024-07-19 ENCOUNTER — APPOINTMENT (OUTPATIENT)
Dept: INTERPRETER SERVICES | Facility: CLINIC | Age: 54
End: 2024-07-19
Payer: COMMERCIAL

## 2024-08-05 ENCOUNTER — PATIENT OUTREACH (OUTPATIENT)
Dept: CARE COORDINATION | Facility: CLINIC | Age: 54
End: 2024-08-05
Payer: COMMERCIAL

## 2024-08-15 DIAGNOSIS — I10 BENIGN ESSENTIAL HYPERTENSION: ICD-10-CM

## 2024-08-15 RX ORDER — METOPROLOL SUCCINATE 100 MG/1
TABLET, EXTENDED RELEASE ORAL
Qty: 30 TABLET | Refills: 5 | Status: CANCELLED | OUTPATIENT
Start: 2024-08-15

## 2024-08-15 NOTE — TELEPHONE ENCOUNTER
----- Message from YUN VYAS sent at 8/14/2024  5:12 PM CDT -----  Regarding: med refill  This patient follows with Infectious disease - but needs refills on his blood pressure medication.  We haven't seen him since 2021.  Please set up an appointment for him for medication check.  ----- Message -----  From: Kristy Shen RN  Sent: 8/14/2024  10:31 AM CDT  To: Yun Vyas MD    Can you have your team coordinate a follow up please?  ----- Message -----  From: Yun Vyas MD  Sent: 8/14/2024  10:27 AM CDT  To: Kristy Shen RN    This patient hasn't been in our clinic since 2021 - we won't refill it unless he is seen.  ----- Message -----  From: Kristy Shen RN  Sent: 8/14/2024   9:22 AM CDT  To: Yun Vyas MD      ----- Message -----  From: Fabricio Frias MD  Sent: 8/13/2024  12:29 PM CDT  To: Kristy Shen RN    PCP  ----- Message -----  From: Kristy Shen RN  Sent: 8/12/2024   2:46 PM CDT  To: Fabricio Frias MD    Refill request received for metoprolol, do you want to fill or have the pt see PCP?  Thanks,  ADELINA Wagner

## 2024-08-20 ENCOUNTER — APPOINTMENT (OUTPATIENT)
Dept: INTERPRETER SERVICES | Facility: CLINIC | Age: 54
End: 2024-08-20
Payer: COMMERCIAL

## 2024-08-20 NOTE — TELEPHONE ENCOUNTER
Future Appointments 8/20/2024 - 2/16/2025        Date Visit Type Length Department Provider     8/28/2024  3:20 PM OFFICE VISIT 20 min [facility] Yun Vyas MD    Location Instructions:     [facility] is located at [address] [place] [address] in [place], at the intersection of [facility]. This is one block south of the [facility]. Free parking is available in the lot directly north of the clinic across [facility]. The clinic is near stops along bus [address].              11/14/2024  2:40 PM RETURN B20 20 min [facility] Fabricio Frias MD    Location Instructions:     Effective Monday, October 16, 2017, we moved to the [facility] located at [facility], [address], [place], [place].&nbsp; Our building is located across the street from [facility] and offers free parking in our on-site lot. Please take the stairs or elevator to the second floor of the [facility] and check in at the  located in the Specialty Clinic, [address].

## 2024-08-28 ENCOUNTER — OFFICE VISIT (OUTPATIENT)
Dept: FAMILY MEDICINE | Facility: CLINIC | Age: 54
End: 2024-08-28
Payer: COMMERCIAL

## 2024-08-28 VITALS
SYSTOLIC BLOOD PRESSURE: 120 MMHG | RESPIRATION RATE: 16 BRPM | DIASTOLIC BLOOD PRESSURE: 78 MMHG | HEIGHT: 68 IN | BODY MASS INDEX: 24.86 KG/M2 | OXYGEN SATURATION: 96 % | HEART RATE: 72 BPM | WEIGHT: 164 LBS | TEMPERATURE: 97.7 F

## 2024-08-28 DIAGNOSIS — R76.11 NONSPECIFIC REACTION TO TUBERCULIN SKIN TEST WITHOUT ACTIVE TUBERCULOSIS: ICD-10-CM

## 2024-08-28 DIAGNOSIS — Z23 NEED FOR DIPHTHERIA-TETANUS-PERTUSSIS (TDAP) VACCINE: ICD-10-CM

## 2024-08-28 DIAGNOSIS — I10 BENIGN ESSENTIAL HYPERTENSION: Primary | ICD-10-CM

## 2024-08-28 DIAGNOSIS — Z12.5 SCREENING FOR PROSTATE CANCER: ICD-10-CM

## 2024-08-28 DIAGNOSIS — Z21 ASYMPTOMATIC HUMAN IMMUNODEFICIENCY VIRUS (HIV) INFECTION STATUS (H): ICD-10-CM

## 2024-08-28 DIAGNOSIS — Z23 NEED FOR MENINGITIS VACCINATION: ICD-10-CM

## 2024-08-28 DIAGNOSIS — Z12.11 SCREEN FOR COLON CANCER: ICD-10-CM

## 2024-08-28 DIAGNOSIS — Z11.59 NEED FOR HEPATITIS C SCREENING TEST: ICD-10-CM

## 2024-08-28 LAB
CHOLEST SERPL-MCNC: 205 MG/DL
FASTING STATUS PATIENT QL REPORTED: NO
HCV AB SERPL QL IA: NONREACTIVE
HDLC SERPL-MCNC: 22 MG/DL
LDLC SERPL CALC-MCNC: ABNORMAL MG/DL
NONHDLC SERPL-MCNC: 183 MG/DL
PSA SERPL DL<=0.01 NG/ML-MCNC: 1.05 NG/ML (ref 0–3.5)
TRIGL SERPL-MCNC: 630 MG/DL

## 2024-08-28 PROCEDURE — 90715 TDAP VACCINE 7 YRS/> IM: CPT | Performed by: FAMILY MEDICINE

## 2024-08-28 PROCEDURE — 86803 HEPATITIS C AB TEST: CPT | Performed by: FAMILY MEDICINE

## 2024-08-28 PROCEDURE — 36415 COLL VENOUS BLD VENIPUNCTURE: CPT | Performed by: FAMILY MEDICINE

## 2024-08-28 PROCEDURE — 99204 OFFICE O/P NEW MOD 45 MIN: CPT | Mod: 25 | Performed by: FAMILY MEDICINE

## 2024-08-28 PROCEDURE — 80048 BASIC METABOLIC PNL TOTAL CA: CPT | Performed by: FAMILY MEDICINE

## 2024-08-28 PROCEDURE — 82040 ASSAY OF SERUM ALBUMIN: CPT | Performed by: FAMILY MEDICINE

## 2024-08-28 PROCEDURE — 84075 ASSAY ALKALINE PHOSPHATASE: CPT | Performed by: FAMILY MEDICINE

## 2024-08-28 PROCEDURE — 80061 LIPID PANEL: CPT | Performed by: FAMILY MEDICINE

## 2024-08-28 PROCEDURE — 90619 MENACWY-TT VACCINE IM: CPT | Performed by: FAMILY MEDICINE

## 2024-08-28 PROCEDURE — 90472 IMMUNIZATION ADMIN EACH ADD: CPT | Performed by: FAMILY MEDICINE

## 2024-08-28 PROCEDURE — 84155 ASSAY OF PROTEIN SERUM: CPT | Performed by: FAMILY MEDICINE

## 2024-08-28 PROCEDURE — 90471 IMMUNIZATION ADMIN: CPT | Performed by: FAMILY MEDICINE

## 2024-08-28 PROCEDURE — 82247 BILIRUBIN TOTAL: CPT | Performed by: FAMILY MEDICINE

## 2024-08-28 PROCEDURE — G0103 PSA SCREENING: HCPCS | Performed by: FAMILY MEDICINE

## 2024-08-28 RX ORDER — METOPROLOL SUCCINATE 100 MG/1
100 TABLET, EXTENDED RELEASE ORAL DAILY
Qty: 90 TABLET | Refills: 2 | Status: SHIPPED | OUTPATIENT
Start: 2024-08-28

## 2024-08-28 NOTE — PROGRESS NOTES
.  1. Benign essential hypertension  Patient is seen today for evaluation of hypertension - he has followed with ID for his HIV infection.  Had not been seen by primary care for years.  Needed a refill on his Metoprolol and ID told him he needed to see primary care.  Before he came here, he was seen by a provider in another network and does have medication available.  Will put refills on his Metoprolol today.  Check labs.    - metoprolol succinate ER (TOPROL XL) 100 MG 24 hr tablet; Take 1 tablet (100 mg) by mouth daily. TAKE 1 TABLET BY MOUTH DAILY.  Dispense: 90 tablet; Refill: 2  - Comprehensive metabolic panel (BMP + Alb, Alk Phos, ALT, AST, Total. Bili, TP); Future  - Lipid Profile (Chol, Trig, HDL, LDL calc); Future  - Comprehensive metabolic panel (BMP + Alb, Alk Phos, ALT, AST, Total. Bili, TP)  - Lipid Profile (Chol, Trig, HDL, LDL calc)    2. Asymptomatic human immunodeficiency virus (hiv) infection status (H)  Patient has asymptomatic HIV infection - follows with ID    3. Nonspecific reaction to tuberculin skin test without active tuberculosis  Patient has history of reaction to TB skin test - follows with ID     4. Screen for colon cancer  Due for colon cancer screening - discussed - declined     5. Need for hepatitis C screening test  Due for hepatitis C screening - ordered   - Hepatitis C Screen Reflex to HCV RNA Quant and Genotype; Future  - Hepatitis C Screen Reflex to HCV RNA Quant and Genotype    6. Need for diphtheria-tetanus-pertussis (Tdap) vaccine  Due for Tdap vaccine - discussed - ordered   - TDAP 10-64Y (ADACEL,BOOSTRIX)    7. Need for meningitis vaccination  Due for meningitis vaccine - discussed - ordered   - MENINGOCOCCAL (MENQUADFI ) (2 YRS - 55 YRS)    8. Screening for prostate cancer  Due for prostate cancer screening - check PSA   - PSA, screen; Future  - PSA, screen      Subjective   Brenda Barnes is a 54 year old, presenting for the following health issues:  Recheck Medication (Pt stated  "he went to [facility] and already got his Blood pressure medication for 3 months he don't know why he is where he was refer to come  here )        8/28/2024     2:58 PM   Additional Questions   Roomed by Klaudia     Was seen at [facility] - for blood pressure refill -       Seen by ID - was seen q 6 months -now 9 months     Discussed vaccines -   Due for multiple vaccines -   Doesn't recall     Works - plating  - on [address]      History of Present Illness       Reason for visit:  Recheck medication   He is taking medications regularly.       Review of Systems   Constitutional:  Negative for chills and fever.   HENT:  Negative for ear pain and sore throat.    Eyes:  Negative for pain and visual disturbance.   Respiratory:  Negative for cough and shortness of breath.    Cardiovascular:  Negative for chest pain and palpitations.   Gastrointestinal:  Negative for abdominal pain and vomiting.   Genitourinary:  Negative for dysuria and hematuria.   Musculoskeletal:  Negative for arthralgias and back pain.   Skin:  Negative for color change and rash.   Neurological:  Negative for seizures and syncope.   All other systems reviewed and are negative.          Objective    /78 (BP Location: Left arm, Patient Position: Sitting, Cuff Size: Adult Regular)   Pulse 72   Temp 97.7  F (36.5  C) (Temporal)   Resp 16   Ht 1.72 m (5' 7.72\")   Wt 74.4 kg (164 lb)   SpO2 96%   BMI 25.14 kg/m    Body mass index is 25.14 kg/m .  Physical Exam  Vitals reviewed.   Constitutional:       General: He is not in acute distress.     Appearance: Normal appearance.   HENT:      Head: Normocephalic.      Right Ear: Tympanic membrane, ear canal and external ear normal.      Left Ear: Tympanic membrane, ear canal and external ear normal.      Nose: Nose normal.      Mouth/Throat:      Mouth: Mucous membranes are moist.      Pharynx: No posterior oropharyngeal erythema.   Eyes:      Extraocular Movements: Extraocular " movements intact.      Conjunctiva/sclera: Conjunctivae normal.      Pupils: Pupils are equal, round, and reactive to light.   Cardiovascular:      Rate and Rhythm: Normal rate and regular rhythm.      Pulses: Normal pulses.      Heart sounds: Normal heart sounds. No murmur heard.  Pulmonary:      Effort: Pulmonary effort is normal.      Breath sounds: Normal breath sounds.   Abdominal:      Palpations: Abdomen is soft. There is no mass.      Tenderness: There is no abdominal tenderness. There is no guarding or rebound.   Musculoskeletal:         General: No deformity. Normal range of motion.      Cervical back: Normal range of motion and neck supple.   Lymphadenopathy:      Cervical: No cervical adenopathy.   Skin:     General: Skin is warm and dry.   Neurological:      General: No focal deficit present.      Mental Status: He is alert and oriented to person, place, and time.   Psychiatric:         Mood and Affect: Mood normal.         Behavior: Behavior normal.            Results for orders placed or performed in visit on 08/28/24   Hepatitis C Screen Reflex to HCV RNA Quant and Genotype     Status: Normal   Result Value Ref Range    Hepatitis C Antibody Nonreactive Nonreactive   Comprehensive metabolic panel (BMP + Alb, Alk Phos, ALT, AST, Total. Bili, TP)     Status: None   Result Value Ref Range    Sodium 141 135 - 145 mmol/L    Potassium 3.8 3.4 - 5.3 mmol/L    Carbon Dioxide (CO2) 22 22 - 29 mmol/L    Anion Gap 12 7 - 15 mmol/L    Urea Nitrogen 19.8 6.0 - 20.0 mg/dL    Creatinine 0.94 0.67 - 1.17 mg/dL    GFR Estimate >90 >60 mL/min/1.73m2    Calcium 9.2 8.8 - 10.4 mg/dL    Chloride 107 98 - 107 mmol/L    Glucose 87 70 - 99 mg/dL    Alkaline Phosphatase 73 40 - 150 U/L    AST      ALT      Protein Total 7.5 6.4 - 8.3 g/dL    Albumin 4.4 3.5 - 5.2 g/dL    Bilirubin Total 0.6 <=1.2 mg/dL    Patient Fasting > 8hrs? No    Lipid Profile (Chol, Trig, HDL, LDL calc)     Status: Abnormal   Result Value Ref Range     Cholesterol 205 (H) <200 mg/dL    Triglycerides 630 (H) <150 mg/dL    Direct Measure HDL 22 (L) >=40 mg/dL    LDL Cholesterol Calculated      Non HDL Cholesterol 183 (H) <130 mg/dL    Patient Fasting > 8hrs? No     Narrative    Cholesterol  Desirable:  <200 mg/dL    Triglycerides  Normal:  Less than 150 mg/dL  Borderline High:  150-199 mg/dL  High:  200-499 mg/dL  Very High:  Greater than or equal to 500 mg/dL    Direct Measure HDL  Female:  Greater than or equal to 50 mg/dL   Male:  Greater than or equal to 40 mg/dL    LDL Cholesterol  Desirable:  <100mg/dL  Above Desirable:  100-129 mg/dL   Borderline High:  130-159 mg/dL   High:  160-189 mg/dL   Very High:  >= 190 mg/dL    Non HDL Cholesterol  Desirable:  130 mg/dL  Above Desirable:  130-159 mg/dL  Borderline High:  160-189 mg/dL  High:  190-219 mg/dL  Very High:  Greater than or equal to 220 mg/dL   PSA, screen     Status: Normal   Result Value Ref Range    Prostate Specific Antigen Screen 1.05 0.00 - 3.50 ng/mL    Narrative    This result is obtained using the Roche Elecsys total PSA method on the evan [id number] immunoassay analyzer. Results obtained with different assay methods or kits cannot be used interchangeably.           Signed Electronically by: RIKY SAPP MD  Prior to immunization administration, verified patients identity using patient s name and date of birth. Please see Immunization Activity for additional information.     Screening Questionnaire for Adult Immunization    Are you sick today?   No   Do you have allergies to medications, food, a vaccine component or latex?   No   Have you ever had a serious reaction after receiving a vaccination?   No   Do you have a long-term health problem with heart, lung, kidney, or metabolic disease (e.g., diabetes), asthma, a blood disorder, no spleen, complement component deficiency, a cochlear implant, or a spinal fluid leak?  Are you on long-term aspirin therapy?   No   Do you have cancer,  leukemia, HIV/AIDS, or any other immune system problem?   Yes   Do you have a parent, brother, or sister with an immune system problem?   No   In the past 3 months, have you taken medications that affect  your immune system, such as prednisone, other steroids, or anticancer drugs; drugs for the treatment of rheumatoid arthritis, Crohn s disease, or psoriasis; or have you had radiation treatments?   No   Have you had a seizure, or a brain or other nervous system problem?   No   During the past year, have you received a transfusion of blood or blood    products, or been given immune (gamma) globulin or antiviral drug?   No   For women: Are you pregnant or is there a chance you could become       pregnant during the next month?   No   Have you received any vaccinations in the past 4 weeks?   No     Immunization questionnaire was positive for at least one answer.  Notified Dr Narvaez.      Patient instructed to remain in clinic for 15 minutes afterwards, and to report any adverse reactions.     Screening performed by Klaudia Simon on 8/28/2024 at 3:03 PM.

## 2024-08-29 LAB
ALBUMIN SERPL BCG-MCNC: 4.4 G/DL (ref 3.5–5.2)
ALP SERPL-CCNC: 73 U/L (ref 40–150)
ALT SERPL W P-5'-P-CCNC: NORMAL U/L
ANION GAP SERPL CALCULATED.3IONS-SCNC: 12 MMOL/L (ref 7–15)
AST SERPL W P-5'-P-CCNC: NORMAL U/L
BILIRUB SERPL-MCNC: 0.6 MG/DL
BUN SERPL-MCNC: 19.8 MG/DL (ref 6–20)
CALCIUM SERPL-MCNC: 9.2 MG/DL (ref 8.8–10.4)
CHLORIDE SERPL-SCNC: 107 MMOL/L (ref 98–107)
CREAT SERPL-MCNC: 0.94 MG/DL (ref 0.67–1.17)
EGFRCR SERPLBLD CKD-EPI 2021: >90 ML/MIN/1.73M2
FASTING STATUS PATIENT QL REPORTED: NO
GLUCOSE SERPL-MCNC: 87 MG/DL (ref 70–99)
HCO3 SERPL-SCNC: 22 MMOL/L (ref 22–29)
POTASSIUM SERPL-SCNC: 3.8 MMOL/L (ref 3.4–5.3)
PROT SERPL-MCNC: 7.5 G/DL (ref 6.4–8.3)
SODIUM SERPL-SCNC: 141 MMOL/L (ref 135–145)

## 2024-09-02 ASSESSMENT — ENCOUNTER SYMPTOMS
COLOR CHANGE: 0
SHORTNESS OF BREATH: 0
BACK PAIN: 0
HEMATURIA: 0
FEVER: 0
COUGH: 0
ABDOMINAL PAIN: 0
CHILLS: 0
SEIZURES: 0
DYSURIA: 0
EYE PAIN: 0
SORE THROAT: 0
VOMITING: 0
ARTHRALGIAS: 0
PALPITATIONS: 0

## 2024-11-14 ENCOUNTER — LAB (OUTPATIENT)
Dept: LAB | Facility: CLINIC | Age: 54
End: 2024-11-14
Payer: COMMERCIAL

## 2024-11-14 ENCOUNTER — OFFICE VISIT (OUTPATIENT)
Dept: INFECTIOUS DISEASES | Facility: CLINIC | Age: 54
End: 2024-11-14
Payer: COMMERCIAL

## 2024-11-14 VITALS — TEMPERATURE: 98.5 F | DIASTOLIC BLOOD PRESSURE: 86 MMHG | HEART RATE: 72 BPM | SYSTOLIC BLOOD PRESSURE: 140 MMHG

## 2024-11-14 DIAGNOSIS — B20 AIDS (ACQUIRED IMMUNE DEFICIENCY SYNDROME) (H): Primary | ICD-10-CM

## 2024-11-14 DIAGNOSIS — Z21 ASYMPTOMATIC HUMAN IMMUNODEFICIENCY VIRUS (HIV) INFECTION STATUS (H): ICD-10-CM

## 2024-11-14 DIAGNOSIS — B20 AIDS (ACQUIRED IMMUNE DEFICIENCY SYNDROME) (H): ICD-10-CM

## 2024-11-14 DIAGNOSIS — Z21 ASYMPTOMATIC HUMAN IMMUNODEFICIENCY VIRUS (HIV) INFECTION STATUS (H): Primary | ICD-10-CM

## 2024-11-14 LAB
BASOPHILS # BLD AUTO: 0.1 10E3/UL (ref 0–0.2)
BASOPHILS NFR BLD AUTO: 1 %
EOSINOPHIL # BLD AUTO: 0.6 10E3/UL (ref 0–0.7)
EOSINOPHIL NFR BLD AUTO: 6 %
ERYTHROCYTE [DISTWIDTH] IN BLOOD BY AUTOMATED COUNT: 12.7 % (ref 10–15)
HCT VFR BLD AUTO: 45.9 % (ref 40–53)
HGB BLD-MCNC: 15.6 G/DL (ref 13.3–17.7)
IMM GRANULOCYTES # BLD: 0 10E3/UL
IMM GRANULOCYTES NFR BLD: 0 %
LYMPHOCYTES # BLD AUTO: 2.7 10E3/UL (ref 0.8–5.3)
LYMPHOCYTES NFR BLD AUTO: 27 %
MCH RBC QN AUTO: 32.2 PG (ref 26.5–33)
MCHC RBC AUTO-ENTMCNC: 34 G/DL (ref 31.5–36.5)
MCV RBC AUTO: 95 FL (ref 78–100)
MONOCYTES # BLD AUTO: 0.8 10E3/UL (ref 0–1.3)
MONOCYTES NFR BLD AUTO: 8 %
NEUTROPHILS # BLD AUTO: 5.8 10E3/UL (ref 1.6–8.3)
NEUTROPHILS NFR BLD AUTO: 59 %
PLATELET # BLD AUTO: 184 10E3/UL (ref 150–450)
RBC # BLD AUTO: 4.84 10E6/UL (ref 4.4–5.9)
WBC # BLD AUTO: 9.9 10E3/UL (ref 4–11)

## 2024-11-14 PROCEDURE — 86359 T CELLS TOTAL COUNT: CPT

## 2024-11-14 PROCEDURE — 86360 T CELL ABSOLUTE COUNT/RATIO: CPT

## 2024-11-14 PROCEDURE — 85025 COMPLETE CBC W/AUTO DIFF WBC: CPT

## 2024-11-14 PROCEDURE — 80053 COMPREHEN METABOLIC PANEL: CPT

## 2024-11-14 PROCEDURE — 36415 COLL VENOUS BLD VENIPUNCTURE: CPT

## 2024-11-14 PROCEDURE — 99213 OFFICE O/P EST LOW 20 MIN: CPT | Performed by: INTERNAL MEDICINE

## 2024-11-14 PROCEDURE — G2211 COMPLEX E/M VISIT ADD ON: HCPCS | Performed by: INTERNAL MEDICINE

## 2024-11-14 NOTE — PROGRESS NOTES
Date of diagnosis:2002  Current ARV/HAART agents:BKT  Prophylaxis/Vaccine data:  Last VL 61 copies may be error  Last CD4 766    HPI/ROS:  Used .  No issues.  Smoker  He correctly picked out which HIV med he is on, off a chart I showed him.          Exam:    AF/VSS  No thrush  No LYMPHADENOPATHY  Neck supple  Cardiovascular regular rate and rhythm  Lungs clear to auscultation B  Abdomen soft, no masses  :  Deferred  Rectal:  deferrred  Skin:  No rash, lesions, masses noted    IMP:   HIV    RECOMMEND:   Routine follow up HIV blood work  Refill medications prn  Continue present management  RTC 9 months  Vaccines look good    JEAN PAUL Frias MD  [facility]  [facility]  [phone]

## 2024-11-15 LAB
ALBUMIN SERPL BCG-MCNC: 4.3 G/DL (ref 3.5–5.2)
ALP SERPL-CCNC: 80 U/L (ref 40–150)
ALT SERPL W P-5'-P-CCNC: 26 U/L (ref 0–70)
ANION GAP SERPL CALCULATED.3IONS-SCNC: 13 MMOL/L (ref 7–15)
AST SERPL W P-5'-P-CCNC: 29 U/L (ref 0–45)
BILIRUB SERPL-MCNC: 0.8 MG/DL
BUN SERPL-MCNC: 18.7 MG/DL (ref 6–20)
CALCIUM SERPL-MCNC: 9.3 MG/DL (ref 8.8–10.4)
CD3 CELLS # BLD: 1874 CELLS/UL (ref 603–2990)
CD3 CELLS NFR BLD: 65 % (ref 49–84)
CD3+CD4+ CELLS # BLD: 844 CELLS/UL (ref 441–2156)
CD3+CD4+ CELLS NFR BLD: 29 % (ref 28–63)
CD3+CD4+ CELLS/CD3+CD8+ CLL BLD: 0.89 % (ref 1.4–2.6)
CD3+CD8+ CELLS # BLD: 952 CELLS/UL (ref 125–1312)
CD3+CD8+ CELLS NFR BLD: 33 % (ref 10–40)
CHLORIDE SERPL-SCNC: 105 MMOL/L (ref 98–107)
CREAT SERPL-MCNC: 1.01 MG/DL (ref 0.67–1.17)
EGFRCR SERPLBLD CKD-EPI 2021: 88 ML/MIN/1.73M2
GLUCOSE SERPL-MCNC: 81 MG/DL (ref 70–99)
HCO3 SERPL-SCNC: 22 MMOL/L (ref 22–29)
POTASSIUM SERPL-SCNC: 3.8 MMOL/L (ref 3.4–5.3)
PROT SERPL-MCNC: 7.5 G/DL (ref 6.4–8.3)
SODIUM SERPL-SCNC: 140 MMOL/L (ref 135–145)
T CELL COMMENT: ABNORMAL

## 2024-11-16 LAB — HIV1 RNA # PLAS NAA DL=20: NOT DETECTED COPIES/ML

## 2024-12-30 ENCOUNTER — PHARMACY VISIT (OUTPATIENT)
Dept: ADMINISTRATIVE | Facility: CLINIC | Age: 54
End: 2024-12-30
Payer: COMMERCIAL

## 2024-12-30 NOTE — PROGRESS NOTES
HIV Clinical Follow Up Assessment    Spoke with Brenda Barnes via [facility]  for assessment. Current Regimen: Biktarvy.     Med/Allergy Changes: None reported     Tolerability: Denies SEs Adherence: PDC = 0.89, decrease from last reported PDC in October. Usually takes a few times to reach him. PT denies any missed doses and reports having about 1 week of med left.     HIV: No concerns. Last saw Dr. Frias in clinic on 11/14/24. Continuing current therapy and scheduled next clinic visit for 8/21/2025.     Labs from 8/28/24: HIV viral load not detected, abs CD4 844, Scr 1.01 (GFR >90)     Follow up: 3 months       Care Details    What are the patient's goals for this therapy?   ? Undetectable      Is the patient meeting treatment goals?   ? Yes      Select patient's HIV therapy type:   ? HIV Treatment    Does the patient report viral suppression at their last lab visit?   ? Yes       Frances Burgess, PharmD, CSP  Therapy Management Pharmacist  [facility]  [facility]

## 2025-03-25 DIAGNOSIS — B20 AIDS (ACQUIRED IMMUNE DEFICIENCY SYNDROME) (H): ICD-10-CM

## 2025-03-28 ENCOUNTER — VIRTUAL VISIT (OUTPATIENT)
Dept: INTERPRETER SERVICES | Facility: CLINIC | Age: 55
End: 2025-03-28
Payer: COMMERCIAL

## 2025-06-25 ENCOUNTER — APPOINTMENT (OUTPATIENT)
Dept: INTERPRETER SERVICES | Facility: CLINIC | Age: 55
End: 2025-06-25
Payer: COMMERCIAL

## 2025-08-05 ENCOUNTER — PATIENT OUTREACH (OUTPATIENT)
Dept: CARE COORDINATION | Facility: CLINIC | Age: 55
End: 2025-08-05
Payer: COMMERCIAL

## 2025-08-21 ENCOUNTER — OFFICE VISIT (OUTPATIENT)
Dept: INFECTIOUS DISEASES | Facility: CLINIC | Age: 55
End: 2025-08-21
Payer: COMMERCIAL

## 2025-08-21 VITALS
BODY MASS INDEX: 25.14 KG/M2 | DIASTOLIC BLOOD PRESSURE: 84 MMHG | HEART RATE: 72 BPM | SYSTOLIC BLOOD PRESSURE: 120 MMHG | WEIGHT: 164 LBS

## 2025-08-21 DIAGNOSIS — B20 AIDS (ACQUIRED IMMUNE DEFICIENCY SYNDROME) (H): Primary | ICD-10-CM

## 2025-08-21 LAB
ALBUMIN SERPL BCG-MCNC: 4.2 G/DL (ref 3.5–5.2)
ALP SERPL-CCNC: 70 U/L (ref 40–150)
ALT SERPL W P-5'-P-CCNC: 19 U/L (ref 0–70)
ANION GAP SERPL CALCULATED.3IONS-SCNC: 11 MMOL/L (ref 7–15)
AST SERPL W P-5'-P-CCNC: 22 U/L (ref 0–45)
BASOPHILS # BLD AUTO: 0.08 10E3/UL (ref 0–0.2)
BASOPHILS NFR BLD AUTO: 1 %
BILIRUB SERPL-MCNC: 0.6 MG/DL
BUN SERPL-MCNC: 21.1 MG/DL (ref 6–20)
CALCIUM SERPL-MCNC: 8.8 MG/DL (ref 8.8–10.4)
CHLORIDE SERPL-SCNC: 105 MMOL/L (ref 98–107)
CREAT SERPL-MCNC: 1.43 MG/DL (ref 0.67–1.17)
EGFRCR SERPLBLD CKD-EPI 2021: 58 ML/MIN/1.73M2
EOSINOPHIL # BLD AUTO: 0.68 10E3/UL (ref 0–0.7)
EOSINOPHIL NFR BLD AUTO: 8.2 %
ERYTHROCYTE [DISTWIDTH] IN BLOOD BY AUTOMATED COUNT: 12.3 % (ref 10–15)
GLUCOSE SERPL-MCNC: 116 MG/DL (ref 70–99)
HCO3 SERPL-SCNC: 24 MMOL/L (ref 22–29)
HCT VFR BLD AUTO: 42.6 % (ref 40–53)
HGB BLD-MCNC: 14.8 G/DL (ref 13.3–17.7)
IMM GRANULOCYTES # BLD: <0.04 10E3/UL
IMM GRANULOCYTES NFR BLD: 0.1 %
LYMPHOCYTES # BLD AUTO: 2.76 10E3/UL (ref 0.8–5.3)
LYMPHOCYTES NFR BLD AUTO: 33.3 %
MCH RBC QN AUTO: 32.9 PG (ref 26.5–33)
MCHC RBC AUTO-ENTMCNC: 34.7 G/DL (ref 31.5–36.5)
MCV RBC AUTO: 94.7 FL (ref 78–100)
MONOCYTES # BLD AUTO: 0.7 10E3/UL (ref 0–1.3)
MONOCYTES NFR BLD AUTO: 8.4 %
NEUTROPHILS # BLD AUTO: 4.07 10E3/UL (ref 1.6–8.3)
NEUTROPHILS NFR BLD AUTO: 49 %
PLATELET # BLD AUTO: 185 10E3/UL (ref 150–450)
POTASSIUM SERPL-SCNC: 3.6 MMOL/L (ref 3.4–5.3)
PROT SERPL-MCNC: 6.9 G/DL (ref 6.4–8.3)
RBC # BLD AUTO: 4.5 10E6/UL (ref 4.4–5.9)
SODIUM SERPL-SCNC: 140 MMOL/L (ref 135–145)
WBC # BLD AUTO: 8.3 10E3/UL (ref 4–11)